# Patient Record
Sex: MALE | Race: WHITE | Employment: FULL TIME | URBAN - NONMETROPOLITAN AREA
[De-identification: names, ages, dates, MRNs, and addresses within clinical notes are randomized per-mention and may not be internally consistent; named-entity substitution may affect disease eponyms.]

---

## 2018-03-03 ENCOUNTER — HOSPITAL ENCOUNTER (EMERGENCY)
Age: 28
Discharge: HOME | End: 2018-03-03
Payer: COMMERCIAL

## 2018-03-03 VITALS
SYSTOLIC BLOOD PRESSURE: 131 MMHG | DIASTOLIC BLOOD PRESSURE: 63 MMHG | OXYGEN SATURATION: 99 % | HEART RATE: 88 BPM | RESPIRATION RATE: 16 BRPM | TEMPERATURE: 98.1 F

## 2018-03-03 DIAGNOSIS — N30.00 ACUTE CYSTITIS WITHOUT HEMATURIA: ICD-10-CM

## 2018-03-03 DIAGNOSIS — R36.0 PENILE DISCHARGE, WITHOUT BLOOD: Primary | ICD-10-CM

## 2018-03-03 LAB
BILIRUBIN URINE: NEGATIVE
BLOOD, URINE: NEGATIVE
CHARACTER, URINE: CLEAR
CHLAMYDIA TRACHOMATIS BY RT-PCR: DETECTED
COLOR: YELLOW
CT/NG SOURCE: ABNORMAL
GLUCOSE, URINE: NEGATIVE MG/DL
KETONES, URINE: NEGATIVE
LEUKOCYTES, UA: ABNORMAL
NEISSERIA GONORRHOEAE BY RT-PCR: NOT DETECTED
NITRITE, URINE: NEGATIVE
PH UA: 5.5 (ref 5–9)
PROTEIN UA: NEGATIVE MG/DL
REFLEX TO URINE C & S: ABNORMAL
SPECIFIC GRAVITY UA: 1.02 (ref 1–1.03)
UROBILINOGEN, URINE: 0.2 EU/DL (ref 0–1)

## 2018-03-03 PROCEDURE — 6360000002 HC RX W HCPCS: Performed by: NURSE PRACTITIONER

## 2018-03-03 PROCEDURE — 99213 OFFICE O/P EST LOW 20 MIN: CPT

## 2018-03-03 PROCEDURE — 99213 OFFICE O/P EST LOW 20 MIN: CPT | Performed by: NURSE PRACTITIONER

## 2018-03-03 PROCEDURE — 81003 URINALYSIS AUTO W/O SCOPE: CPT

## 2018-03-03 PROCEDURE — 2500000003 HC RX 250 WO HCPCS: Performed by: NURSE PRACTITIONER

## 2018-03-03 PROCEDURE — 6370000000 HC RX 637 (ALT 250 FOR IP): Performed by: NURSE PRACTITIONER

## 2018-03-03 PROCEDURE — 87491 CHLMYD TRACH DNA AMP PROBE: CPT

## 2018-03-03 PROCEDURE — 87591 N.GONORRHOEAE DNA AMP PROB: CPT

## 2018-03-03 PROCEDURE — 96372 THER/PROPH/DIAG INJ SC/IM: CPT

## 2018-03-03 PROCEDURE — 87086 URINE CULTURE/COLONY COUNT: CPT

## 2018-03-03 RX ORDER — AZITHROMYCIN 250 MG/1
1000 TABLET, FILM COATED ORAL ONCE
Status: COMPLETED | OUTPATIENT
Start: 2018-03-03 | End: 2018-03-03

## 2018-03-03 RX ORDER — METRONIDAZOLE 500 MG/1
500 TABLET ORAL 2 TIMES DAILY
Qty: 14 TABLET | Refills: 0 | Status: SHIPPED | OUTPATIENT
Start: 2018-03-03 | End: 2018-03-10

## 2018-03-03 RX ORDER — CIPROFLOXACIN 500 MG/1
500 TABLET, FILM COATED ORAL 2 TIMES DAILY
Qty: 10 TABLET | Refills: 0 | Status: SHIPPED | OUTPATIENT
Start: 2018-03-03 | End: 2018-03-08

## 2018-03-03 RX ADMIN — LIDOCAINE HYDROCHLORIDE 250 MG: 10 INJECTION, SOLUTION EPIDURAL; INFILTRATION; INTRACAUDAL; PERINEURAL at 09:19

## 2018-03-03 RX ADMIN — AZITHROMYCIN 1000 MG: 250 TABLET, FILM COATED ORAL at 09:18

## 2018-03-03 ASSESSMENT — ENCOUNTER SYMPTOMS
EYE PAIN: 0
CONSTIPATION: 0
EYE DISCHARGE: 0
DIARRHEA: 0
BACK PAIN: 0
SORE THROAT: 0
ABDOMINAL PAIN: 0
NAUSEA: 0
SHORTNESS OF BREATH: 0
RHINORRHEA: 0
STRIDOR: 0
WHEEZING: 0
COUGH: 0
COLOR CHANGE: 0
VOMITING: 0

## 2018-03-03 NOTE — ED TRIAGE NOTES
Patient walked to room 6 for penile discharge onset this morning, patient denies any pain. No other needs.

## 2018-03-03 NOTE — LETTER
[facility]  [address] [place]  Phone: [phone]               March 3, 2018    Patient: Leticia Pérez   YOB: 1990   Date of Visit: 3/3/2018       To Whom It May Concern:    Keri Apley was seen and treated in our emergency department on 3/3/2018. He may return to work on March 4, 2018.       Sincerely,       Angel Chi RN         Signature:__________________________________

## 2018-03-05 LAB — URINE CULTURE REFLEX: NORMAL

## 2018-11-05 ENCOUNTER — HOSPITAL ENCOUNTER (EMERGENCY)
Age: 28
Discharge: HOME | End: 2018-11-05
Payer: COMMERCIAL

## 2018-11-05 VITALS
OXYGEN SATURATION: 97 % | HEART RATE: 62 BPM | WEIGHT: 225 LBS | BODY MASS INDEX: 31.5 KG/M2 | DIASTOLIC BLOOD PRESSURE: 87 MMHG | TEMPERATURE: 98.3 F | HEIGHT: 71 IN | RESPIRATION RATE: 14 BRPM | SYSTOLIC BLOOD PRESSURE: 142 MMHG

## 2018-11-05 DIAGNOSIS — S71.112A LACERATION OF LEFT THIGH, INITIAL ENCOUNTER: Primary | ICD-10-CM

## 2018-11-05 PROCEDURE — 2709999900 HC NON-CHARGEABLE SUPPLY

## 2018-11-05 PROCEDURE — 99212 OFFICE O/P EST SF 10 MIN: CPT

## 2018-11-05 PROCEDURE — 12001 RPR S/N/AX/GEN/TRNK 2.5CM/<: CPT | Performed by: NURSE PRACTITIONER

## 2018-11-05 PROCEDURE — 12001 RPR S/N/AX/GEN/TRNK 2.5CM/<: CPT

## 2018-11-05 PROCEDURE — 99214 OFFICE O/P EST MOD 30 MIN: CPT | Performed by: NURSE PRACTITIONER

## 2018-11-05 ASSESSMENT — PAIN DESCRIPTION - PROGRESSION: CLINICAL_PROGRESSION: NOT CHANGED

## 2018-11-05 ASSESSMENT — PAIN DESCRIPTION - ORIENTATION: ORIENTATION: LEFT;ANTERIOR;UPPER

## 2018-11-05 ASSESSMENT — PAIN DESCRIPTION - PAIN TYPE: TYPE: ACUTE PAIN

## 2018-11-05 ASSESSMENT — PAIN DESCRIPTION - LOCATION: LOCATION: LEG

## 2018-11-05 ASSESSMENT — PAIN DESCRIPTION - FREQUENCY: FREQUENCY: CONTINUOUS

## 2018-11-05 ASSESSMENT — PAIN DESCRIPTION - DESCRIPTORS: DESCRIPTORS: ACHING

## 2018-11-05 ASSESSMENT — PAIN DESCRIPTION - ONSET: ONSET: SUDDEN

## 2018-11-05 ASSESSMENT — PAIN SCALES - GENERAL: PAINLEVEL_OUTOF10: 1

## 2018-11-06 ASSESSMENT — ENCOUNTER SYMPTOMS
VOMITING: 0
NAUSEA: 0

## 2018-11-17 ENCOUNTER — HOSPITAL ENCOUNTER (EMERGENCY)
Age: 28
Discharge: HOME | End: 2018-11-17

## 2018-11-17 VITALS
RESPIRATION RATE: 18 BRPM | OXYGEN SATURATION: 96 % | DIASTOLIC BLOOD PRESSURE: 69 MMHG | HEART RATE: 73 BPM | TEMPERATURE: 98.3 F | SYSTOLIC BLOOD PRESSURE: 141 MMHG

## 2018-11-17 DIAGNOSIS — Z48.02 ENCOUNTER FOR REMOVAL OF SUTURES: Primary | ICD-10-CM

## 2018-11-17 PROCEDURE — 2709999900 HC NON-CHARGEABLE SUPPLY

## 2018-11-17 PROCEDURE — 99024 POSTOP FOLLOW-UP VISIT: CPT | Performed by: NURSE PRACTITIONER

## 2018-11-17 PROCEDURE — 99211 OFF/OP EST MAY X REQ PHY/QHP: CPT

## 2018-11-17 ASSESSMENT — ENCOUNTER SYMPTOMS
VOMITING: 0
NAUSEA: 0

## 2019-01-29 ENCOUNTER — HOSPITAL ENCOUNTER (EMERGENCY)
Age: 29
Discharge: HOME | End: 2019-01-29

## 2019-01-29 VITALS
RESPIRATION RATE: 14 BRPM | SYSTOLIC BLOOD PRESSURE: 138 MMHG | DIASTOLIC BLOOD PRESSURE: 81 MMHG | HEIGHT: 71 IN | HEART RATE: 98 BPM | WEIGHT: 225 LBS | OXYGEN SATURATION: 96 % | TEMPERATURE: 98.8 F | BODY MASS INDEX: 31.5 KG/M2

## 2019-01-29 DIAGNOSIS — J02.9 ACUTE PHARYNGITIS, UNSPECIFIED ETIOLOGY: Primary | ICD-10-CM

## 2019-01-29 PROCEDURE — 99212 OFFICE O/P EST SF 10 MIN: CPT

## 2019-01-29 PROCEDURE — 99213 OFFICE O/P EST LOW 20 MIN: CPT | Performed by: NURSE PRACTITIONER

## 2019-01-29 RX ORDER — AZITHROMYCIN 250 MG/1
TABLET, FILM COATED ORAL
Qty: 6 TABLET | Refills: 0 | Status: SHIPPED | OUTPATIENT
Start: 2019-01-29 | End: 2019-10-18

## 2019-01-29 ASSESSMENT — PAIN DESCRIPTION - FREQUENCY: FREQUENCY: CONTINUOUS

## 2019-01-29 ASSESSMENT — ENCOUNTER SYMPTOMS
SINUS PAIN: 0
TROUBLE SWALLOWING: 0
ABDOMINAL PAIN: 0
VOMITING: 0
CHEST TIGHTNESS: 0
NAUSEA: 0
DIARRHEA: 0
COUGH: 0
BACK PAIN: 0
SORE THROAT: 1
SINUS PRESSURE: 0
SHORTNESS OF BREATH: 0
SINUS CONGESTION: 0
RHINORRHEA: 0

## 2019-01-29 ASSESSMENT — PAIN DESCRIPTION - DESCRIPTORS: DESCRIPTORS: SHARP

## 2019-01-29 ASSESSMENT — PAIN - FUNCTIONAL ASSESSMENT: PAIN_FUNCTIONAL_ASSESSMENT: ACTIVITIES ARE NOT PREVENTED

## 2019-01-29 ASSESSMENT — PAIN DESCRIPTION - PAIN TYPE: TYPE: ACUTE PAIN

## 2019-01-29 ASSESSMENT — PAIN SCALES - GENERAL: PAINLEVEL_OUTOF10: 6

## 2019-01-29 ASSESSMENT — PAIN DESCRIPTION - PROGRESSION: CLINICAL_PROGRESSION: GRADUALLY WORSENING

## 2019-01-29 ASSESSMENT — PAIN DESCRIPTION - LOCATION: LOCATION: THROAT

## 2019-01-29 ASSESSMENT — PAIN DESCRIPTION - ONSET: ONSET: GRADUAL

## 2019-10-18 ENCOUNTER — HOSPITAL ENCOUNTER (EMERGENCY)
Age: 29
Discharge: HOME | End: 2019-10-18

## 2019-10-18 VITALS
WEIGHT: 220 LBS | TEMPERATURE: 98.3 F | RESPIRATION RATE: 16 BRPM | HEART RATE: 75 BPM | SYSTOLIC BLOOD PRESSURE: 139 MMHG | BODY MASS INDEX: 30.68 KG/M2 | DIASTOLIC BLOOD PRESSURE: 75 MMHG

## 2019-10-18 DIAGNOSIS — R35.0 URINARY FREQUENCY: Primary | ICD-10-CM

## 2019-10-18 DIAGNOSIS — S39.012A STRAIN OF LUMBAR REGION, INITIAL ENCOUNTER: ICD-10-CM

## 2019-10-18 LAB
BACTERIA: ABNORMAL /HPF
BILIRUBIN URINE: NEGATIVE
BLOOD, URINE: NEGATIVE
CASTS 2: ABNORMAL /LPF
CASTS UA: ABNORMAL /LPF
CHARACTER, URINE: CLEAR
CHLAMYDIA TRACHOMATIS BY RT-PCR: NOT DETECTED
COLOR: YELLOW
CRYSTALS, UA: ABNORMAL
CT/NG SOURCE: NORMAL
EPITHELIAL CELLS, UA: ABNORMAL /HPF
GLUCOSE URINE: NEGATIVE MG/DL
KETONES, URINE: NEGATIVE
LEUKOCYTE ESTERASE, URINE: ABNORMAL
MISCELLANEOUS 2: ABNORMAL
NEISSERIA GONORRHOEAE BY RT-PCR: NOT DETECTED
NITRITE, URINE: NEGATIVE
PH UA: 5.5 (ref 5–9)
PROTEIN UA: NEGATIVE
RBC URINE: ABNORMAL /HPF
RENAL EPITHELIAL, UA: ABNORMAL
SPECIFIC GRAVITY, URINE: 1.02 (ref 1–1.03)
UROBILINOGEN, URINE: 1 EU/DL (ref 0–1)
WBC UA: ABNORMAL /HPF
YEAST: ABNORMAL

## 2019-10-18 PROCEDURE — 99283 EMERGENCY DEPT VISIT LOW MDM: CPT

## 2019-10-18 PROCEDURE — 87491 CHLMYD TRACH DNA AMP PROBE: CPT

## 2019-10-18 PROCEDURE — 87591 N.GONORRHOEAE DNA AMP PROB: CPT

## 2019-10-18 PROCEDURE — 81001 URINALYSIS AUTO W/SCOPE: CPT

## 2019-10-18 ASSESSMENT — ENCOUNTER SYMPTOMS
COUGH: 0
SORE THROAT: 0
NAUSEA: 1
DIARRHEA: 0
SINUS PAIN: 0
BACK PAIN: 1
CHEST TIGHTNESS: 0
VOMITING: 0
ABDOMINAL DISTENTION: 0
SHORTNESS OF BREATH: 0
ABDOMINAL PAIN: 0

## 2019-10-18 ASSESSMENT — PAIN DESCRIPTION - ORIENTATION: ORIENTATION: RIGHT;LEFT

## 2019-10-18 ASSESSMENT — PAIN SCALES - GENERAL: PAINLEVEL_OUTOF10: 3

## 2019-10-18 ASSESSMENT — PAIN DESCRIPTION - PAIN TYPE: TYPE: ACUTE PAIN

## 2019-10-18 ASSESSMENT — PAIN DESCRIPTION - LOCATION: LOCATION: BACK;GROIN

## 2019-10-18 ASSESSMENT — PAIN DESCRIPTION - DESCRIPTORS: DESCRIPTORS: ACHING;BURNING

## 2019-11-19 ENCOUNTER — HOSPITAL ENCOUNTER (EMERGENCY)
Age: 29
Discharge: HOME | End: 2019-11-19

## 2019-11-19 VITALS
HEIGHT: 71 IN | BODY MASS INDEX: 32.9 KG/M2 | OXYGEN SATURATION: 97 % | WEIGHT: 235 LBS | RESPIRATION RATE: 16 BRPM | HEART RATE: 70 BPM | SYSTOLIC BLOOD PRESSURE: 131 MMHG | DIASTOLIC BLOOD PRESSURE: 78 MMHG | TEMPERATURE: 97.7 F

## 2019-11-19 DIAGNOSIS — J02.9 ACUTE PHARYNGITIS, UNSPECIFIED ETIOLOGY: Primary | ICD-10-CM

## 2019-11-19 LAB
GROUP A STREP CULTURE, REFLEX: NEGATIVE
REFLEX THROAT C + S: NORMAL

## 2019-11-19 PROCEDURE — 99213 OFFICE O/P EST LOW 20 MIN: CPT | Performed by: NURSE PRACTITIONER

## 2019-11-19 PROCEDURE — 99214 OFFICE O/P EST MOD 30 MIN: CPT

## 2019-11-19 PROCEDURE — 87070 CULTURE OTHR SPECIMN AEROBIC: CPT

## 2019-11-19 PROCEDURE — 87880 STREP A ASSAY W/OPTIC: CPT

## 2019-11-19 ASSESSMENT — ENCOUNTER SYMPTOMS
SINUS PRESSURE: 0
COUGH: 1
SORE THROAT: 1
NAUSEA: 1
SHORTNESS OF BREATH: 0
VOMITING: 0
DIARRHEA: 0

## 2019-11-19 ASSESSMENT — PAIN DESCRIPTION - LOCATION: LOCATION: THROAT

## 2019-11-19 ASSESSMENT — PAIN SCALES - GENERAL: PAINLEVEL_OUTOF10: 7

## 2019-11-21 LAB — THROAT/NOSE CULTURE: NORMAL

## 2019-12-02 ENCOUNTER — HOSPITAL ENCOUNTER (EMERGENCY)
Age: 29
Discharge: HOME | End: 2019-12-02

## 2019-12-02 VITALS
BODY MASS INDEX: 32.78 KG/M2 | DIASTOLIC BLOOD PRESSURE: 72 MMHG | TEMPERATURE: 97.6 F | HEART RATE: 76 BPM | WEIGHT: 235 LBS | RESPIRATION RATE: 16 BRPM | SYSTOLIC BLOOD PRESSURE: 150 MMHG | OXYGEN SATURATION: 98 %

## 2019-12-02 DIAGNOSIS — R36.9 ABNORMAL PENILE DISCHARGE: Primary | ICD-10-CM

## 2019-12-02 LAB
BILIRUBIN URINE: NEGATIVE
BLOOD, URINE: NEGATIVE
CHARACTER, URINE: CLEAR
CHLAMYDIA TRACHOMATIS BY RT-PCR: NOT DETECTED
COLOR: YELLOW
CT/NG SOURCE: NORMAL
GLUCOSE, URINE: NEGATIVE MG/DL
KETONES, URINE: NEGATIVE
LEUKOCYTES, UA: ABNORMAL
NEISSERIA GONORRHOEAE BY RT-PCR: NOT DETECTED
NITRITE, URINE: NEGATIVE
PH UA: 5.5 (ref 5–9)
PROTEIN UA: NEGATIVE MG/DL
REFLEX TO URINE C & S: ABNORMAL
SPECIFIC GRAVITY UA: 1.01 (ref 1–1.03)
UROBILINOGEN, URINE: 0.2 EU/DL (ref 0–1)

## 2019-12-02 PROCEDURE — 6370000000 HC RX 637 (ALT 250 FOR IP): Performed by: NURSE PRACTITIONER

## 2019-12-02 PROCEDURE — 87491 CHLMYD TRACH DNA AMP PROBE: CPT

## 2019-12-02 PROCEDURE — 96372 THER/PROPH/DIAG INJ SC/IM: CPT

## 2019-12-02 PROCEDURE — 99213 OFFICE O/P EST LOW 20 MIN: CPT | Performed by: NURSE PRACTITIONER

## 2019-12-02 PROCEDURE — 6360000002 HC RX W HCPCS: Performed by: NURSE PRACTITIONER

## 2019-12-02 PROCEDURE — 81003 URINALYSIS AUTO W/O SCOPE: CPT

## 2019-12-02 PROCEDURE — 87591 N.GONORRHOEAE DNA AMP PROB: CPT

## 2019-12-02 PROCEDURE — 99213 OFFICE O/P EST LOW 20 MIN: CPT

## 2019-12-02 PROCEDURE — 2500000003 HC RX 250 WO HCPCS: Performed by: NURSE PRACTITIONER

## 2019-12-02 RX ORDER — AZITHROMYCIN 250 MG/1
1000 TABLET, FILM COATED ORAL ONCE
Status: COMPLETED | OUTPATIENT
Start: 2019-12-02 | End: 2019-12-02

## 2019-12-02 RX ORDER — METRONIDAZOLE 500 MG/1
2000 TABLET ORAL ONCE
Status: COMPLETED | OUTPATIENT
Start: 2019-12-02 | End: 2019-12-02

## 2019-12-02 RX ADMIN — AZITHROMYCIN 1000 MG: 250 TABLET, FILM COATED ORAL at 14:35

## 2019-12-02 RX ADMIN — METRONIDAZOLE 2000 MG: 500 TABLET, FILM COATED ORAL at 14:35

## 2019-12-02 RX ADMIN — LIDOCAINE HYDROCHLORIDE 250 MG: 10 INJECTION, SOLUTION EPIDURAL; INFILTRATION; INTRACAUDAL; PERINEURAL at 14:34

## 2019-12-02 ASSESSMENT — ENCOUNTER SYMPTOMS: ABDOMINAL PAIN: 0

## 2020-06-15 ENCOUNTER — HOSPITAL ENCOUNTER (EMERGENCY)
Age: 30
Discharge: HOME | End: 2020-06-15

## 2020-06-15 VITALS
DIASTOLIC BLOOD PRESSURE: 71 MMHG | RESPIRATION RATE: 14 BRPM | HEART RATE: 85 BPM | TEMPERATURE: 98.3 F | HEIGHT: 71 IN | BODY MASS INDEX: 32.9 KG/M2 | OXYGEN SATURATION: 99 % | SYSTOLIC BLOOD PRESSURE: 137 MMHG | WEIGHT: 235 LBS

## 2020-06-15 LAB
BILIRUBIN URINE: NEGATIVE
BLOOD, URINE: NEGATIVE
CHARACTER, URINE: CLEAR
COLOR: YELLOW
GLUCOSE URINE: NEGATIVE MG/DL
KETONES, URINE: NEGATIVE
LEUKOCYTE ESTERASE, URINE: ABNORMAL
NITRITE, URINE: NEGATIVE
PH UA: 6 (ref 5–9)
PROTEIN UA: NEGATIVE MG/DL
SPECIFIC GRAVITY, URINE: 1.02 (ref 1–1.03)
UROBILINOGEN, URINE: 1 EU/DL (ref 0.2–1)

## 2020-06-15 PROCEDURE — 2500000003 HC RX 250 WO HCPCS: Performed by: NURSE PRACTITIONER

## 2020-06-15 PROCEDURE — 81003 URINALYSIS AUTO W/O SCOPE: CPT

## 2020-06-15 PROCEDURE — 99213 OFFICE O/P EST LOW 20 MIN: CPT | Performed by: NURSE PRACTITIONER

## 2020-06-15 PROCEDURE — 6360000002 HC RX W HCPCS: Performed by: NURSE PRACTITIONER

## 2020-06-15 PROCEDURE — 87591 N.GONORRHOEAE DNA AMP PROB: CPT

## 2020-06-15 PROCEDURE — 6370000000 HC RX 637 (ALT 250 FOR IP): Performed by: NURSE PRACTITIONER

## 2020-06-15 PROCEDURE — 99213 OFFICE O/P EST LOW 20 MIN: CPT

## 2020-06-15 PROCEDURE — 87491 CHLMYD TRACH DNA AMP PROBE: CPT

## 2020-06-15 PROCEDURE — 96372 THER/PROPH/DIAG INJ SC/IM: CPT

## 2020-06-15 RX ORDER — METRONIDAZOLE 500 MG/1
2000 TABLET ORAL ONCE
Qty: 4 TABLET | Refills: 0 | Status: SHIPPED | OUTPATIENT
Start: 2020-06-15 | End: 2020-06-15

## 2020-06-15 RX ORDER — AZITHROMYCIN 250 MG/1
1000 TABLET, FILM COATED ORAL ONCE
Status: COMPLETED | OUTPATIENT
Start: 2020-06-15 | End: 2020-06-15

## 2020-06-15 RX ADMIN — AZITHROMYCIN MONOHYDRATE 1000 MG: 250 TABLET ORAL at 18:11

## 2020-06-15 RX ADMIN — LIDOCAINE HYDROCHLORIDE 250 MG: 10 INJECTION, SOLUTION EPIDURAL; INFILTRATION; INTRACAUDAL; PERINEURAL at 18:13

## 2020-06-15 ASSESSMENT — PAIN DESCRIPTION - PAIN TYPE: TYPE: ACUTE PAIN

## 2020-06-15 ASSESSMENT — PAIN DESCRIPTION - FREQUENCY: FREQUENCY: INTERMITTENT

## 2020-06-15 ASSESSMENT — PAIN - FUNCTIONAL ASSESSMENT: PAIN_FUNCTIONAL_ASSESSMENT: PREVENTS OR INTERFERES WITH MANY ACTIVE NOT PASSIVE ACTIVITIES

## 2020-06-15 ASSESSMENT — PAIN DESCRIPTION - LOCATION: LOCATION: PENIS

## 2020-06-15 ASSESSMENT — PAIN DESCRIPTION - DESCRIPTORS: DESCRIPTORS: BURNING;DISCOMFORT

## 2020-06-15 ASSESSMENT — PAIN DESCRIPTION - PROGRESSION: CLINICAL_PROGRESSION: GRADUALLY WORSENING

## 2020-06-15 ASSESSMENT — PAIN SCALES - GENERAL: PAINLEVEL_OUTOF10: 4

## 2020-06-15 NOTE — ED NOTES
No change in patients condition. No adverse medication reaction noted with meds given. Discharge instructions and prescriptions discussed with pt. Pt. Verbalized understanding of info given and ambulated to exit in stable condition.       Javan Clifford RN  06/15/20 6429

## 2020-06-15 NOTE — ED PROVIDER NOTES
[address]  Urgent Care Encounter       CHIEF COMPLAINT       Chief Complaint   Patient presents with    Penile Discharge     onset friday        Nurses Notes reviewed and I agree except as noted in the HPI. HISTORY OF PRESENT ILLNESS   Savannah Delgado is a 30 y.o. male who presents     Patient states that for the last 2-3 days he has white penile discharge. He states that he is concerned he could have possible STD. Denies any lesions or testicular pain. Denies any fevers. He reports being intimate with only one a partner for the last several months. REVIEW OF SYSTEMS     Review of Systems   Constitutional: Negative for chills, fatigue and fever. Genitourinary: Positive for discharge. Negative for decreased urine volume, difficulty urinating, dysuria, enuresis, flank pain, frequency, genital sores, hematuria, penile pain, penile swelling, scrotal swelling, testicular pain and urgency. Skin: Negative for rash. PAST MEDICAL HISTORY         Diagnosis Date    GERD (gastroesophageal reflux disease)     STD (male)        SURGICALHISTORY     Patient  has a past surgical history that includes Tympanostomy tube placement. CURRENT MEDICATIONS       Previous Medications    TESTOSTERONE CYPIONATE 250 MG/ML SOLN    Inject 250 mg into the muscle. THERAPEUTIC MULTIVITAMIN-MINERALS (THERAGRAN-M) TABLET    Take 1 tablet by mouth daily. ALLERGIES     Patient is has No Known Allergies. Patients   Immunization History   Administered Date(s) Administered    Influenza Vaccine, unspecified formulation 10/11/2016       FAMILY HISTORY     Patient's family history includes Diabetes in his maternal grandfather and paternal grandmother; Heart Disease in his maternal grandfather; No Known Problems in his father and mother. SOCIAL HISTORY     Patient  reports that he has never smoked.  He has never used smokeless tobacco. He reports current alcohol use of about 1.0 standard PROCEDURES:  None    FINAL IMPRESSION      1. Screening examination for STD (sexually transmitted disease)    2. Dysuria          DISPOSITION/ PLAN   Patient is discharged home with prescription for Flagyl for treatment of possible exposure to STD. Discussed with patient that urinalysis is negative for any leukocytes or nitrates. Patient is informed that gonorrhea and Chlamydia cultures can take up to 3 days to result. Patient should refrain from any sexual intercourse until all results have been obtained. Recommend follow-up with health department within 2 weeks for recheck. Patient is informed that trichomonas testing for males is not available in urgent care, however given patient's symptoms do have suspicion for.         PATIENT REFERRED TO:  Mariaelena Patel PA-C  [address] [facility] / [place] [address]      DISCHARGE MEDICATIONS:  New Prescriptions    METRONIDAZOLE (FLAGYL) 500 MG TABLET    Take 4 tablets by mouth once for 1 dose       Discontinued Medications    No medications on file       Current Discharge Medication List          EVITA Knapp NP    (Please note that portions of this note were completed with a voice recognition program. Efforts were made to edit the dictations but occasionally words are mis-transcribed.)         EVITA Bravo NP  06/15/20 1937

## 2020-06-16 LAB
CHLAMYDIA TRACHOMATIS BY RT-PCR: DETECTED
CT/NG SOURCE: ABNORMAL
NEISSERIA GONORRHOEAE BY RT-PCR: NOT DETECTED

## 2020-06-27 ENCOUNTER — HOSPITAL ENCOUNTER (EMERGENCY)
Age: 30
Discharge: HOME | End: 2020-06-27

## 2020-06-27 VITALS
SYSTOLIC BLOOD PRESSURE: 145 MMHG | HEART RATE: 87 BPM | RESPIRATION RATE: 16 BRPM | DIASTOLIC BLOOD PRESSURE: 88 MMHG | TEMPERATURE: 97.8 F

## 2020-06-27 LAB
BILIRUBIN URINE: NEGATIVE
BLOOD, URINE: NEGATIVE
CHARACTER, URINE: CLEAR
COLOR: YELLOW
GLUCOSE URINE: NEGATIVE MG/DL
KETONES, URINE: NEGATIVE
LEUKOCYTE ESTERASE, URINE: NEGATIVE
NITRITE, URINE: NEGATIVE
PH UA: 6 (ref 5–9)
PROTEIN UA: NEGATIVE MG/DL
SPECIFIC GRAVITY, URINE: 1.02 (ref 1–1.03)
UROBILINOGEN, URINE: 0.2 EU/DL (ref 0.2–1)

## 2020-06-27 PROCEDURE — 96372 THER/PROPH/DIAG INJ SC/IM: CPT

## 2020-06-27 PROCEDURE — 81003 URINALYSIS AUTO W/O SCOPE: CPT

## 2020-06-27 PROCEDURE — 87591 N.GONORRHOEAE DNA AMP PROB: CPT

## 2020-06-27 PROCEDURE — 87491 CHLMYD TRACH DNA AMP PROBE: CPT

## 2020-06-27 PROCEDURE — 6360000002 HC RX W HCPCS: Performed by: NURSE PRACTITIONER

## 2020-06-27 PROCEDURE — 99213 OFFICE O/P EST LOW 20 MIN: CPT

## 2020-06-27 PROCEDURE — 6370000000 HC RX 637 (ALT 250 FOR IP): Performed by: NURSE PRACTITIONER

## 2020-06-27 PROCEDURE — 2500000003 HC RX 250 WO HCPCS: Performed by: NURSE PRACTITIONER

## 2020-06-27 PROCEDURE — 99213 OFFICE O/P EST LOW 20 MIN: CPT | Performed by: NURSE PRACTITIONER

## 2020-06-27 RX ORDER — AZITHROMYCIN 250 MG/1
1000 TABLET, FILM COATED ORAL ONCE
Status: COMPLETED | OUTPATIENT
Start: 2020-06-27 | End: 2020-06-27

## 2020-06-27 RX ADMIN — AZITHROMYCIN MONOHYDRATE 1000 MG: 250 TABLET ORAL at 15:59

## 2020-06-27 RX ADMIN — LIDOCAINE HYDROCHLORIDE 250 MG: 10 INJECTION, SOLUTION EPIDURAL; INFILTRATION; INTRACAUDAL; PERINEURAL at 16:02

## 2020-06-27 ASSESSMENT — PAIN DESCRIPTION - LOCATION: LOCATION: PENIS

## 2020-06-27 ASSESSMENT — PAIN DESCRIPTION - DESCRIPTORS: DESCRIPTORS: DISCOMFORT

## 2020-06-27 ASSESSMENT — ENCOUNTER SYMPTOMS
NAUSEA: 0
VOMITING: 0

## 2020-06-27 ASSESSMENT — PAIN DESCRIPTION - ONSET: ONSET: GRADUAL

## 2020-06-27 ASSESSMENT — PAIN DESCRIPTION - PAIN TYPE: TYPE: ACUTE PAIN

## 2020-06-27 ASSESSMENT — PAIN DESCRIPTION - FREQUENCY: FREQUENCY: INTERMITTENT

## 2020-06-27 ASSESSMENT — PAIN SCALES - GENERAL: PAINLEVEL_OUTOF10: 5

## 2020-06-27 ASSESSMENT — PAIN - FUNCTIONAL ASSESSMENT: PAIN_FUNCTIONAL_ASSESSMENT: PREVENTS OR INTERFERES SOME ACTIVE ACTIVITIES AND ADLS

## 2020-06-27 ASSESSMENT — PAIN DESCRIPTION - PROGRESSION: CLINICAL_PROGRESSION: GRADUALLY WORSENING

## 2020-06-27 NOTE — ED NOTES
No change in patients condition. Discharge instructions and prescriptions discussed with pt. Pt. Verbalized understanding of info given and ambulated to exit in stable condition.       Mati Fraser RN  06/27/20 4618

## 2020-06-27 NOTE — ED PROVIDER NOTES
IMAGING:    No orders to display         EKG:      URGENT CARE COURSE:     Vitals:    06/27/20 1520   BP: (!) 145/88   Pulse: 87   Resp: 16   Temp: 97.8 °F (36.6 °C)   TempSrc: Temporal       Medications   azithromycin (ZITHROMAX) tablet 1,000 mg (1,000 mg Oral Given 6/27/20 1559)   cefTRIAXone (ROCEPHIN) 250 mg in lidocaine 1 % 1 mL IM Injection (250 mg Intramuscular Given 6/27/20 1602)            PROCEDURES:  None    FINAL IMPRESSION      1. Penile discharge    2. Chlamydia infection          DISPOSITION/ PLAN     The patient presents with penile discharge and dysuria. He did test positive for chlamydia on [address] 15. He was treated at that time however symptoms have returned without any further sexual encounters. Will treat again with azithromycin and ceftriaxone. Patient instructed to abstain from sex for at least the next 7 days. Gonorrhea and Chlamydia testing will be completed. Follow-up in the next week with any further concerns with family doctor or the urgent care clinic. Further instructions were outlined verbally and in the patient's discharge instructions. All the patient's questions were answered. The patient/parent agreed with the plan and was discharged from the [facility] in good condition.       PATIENT REFERRED TO:  Samuel Dill PA-C  [address] [facility] / [address] [facility]      DISCHARGE MEDICATIONS:  Discharge Medication List as of 6/27/2020  3:51 PM          Discharge Medication List as of 6/27/2020  3:51 PM          Discharge Medication List as of 6/27/2020  3:51 PM          EVITA Byrne CNP    (Please note that portions of this note were completed with a voice recognition program. Efforts were made to edit the dictations but occasionally words are mis-transcribed.)         EVITA Byrne CNP  06/27/20 9767

## 2020-06-28 LAB
CHLAMYDIA TRACHOMATIS BY RT-PCR: NOT DETECTED
CT/NG SOURCE: NORMAL
NEISSERIA GONORRHOEAE BY RT-PCR: NOT DETECTED

## 2021-11-08 ENCOUNTER — HOSPITAL ENCOUNTER (EMERGENCY)
Age: 31
Discharge: HOME | End: 2021-11-08
Payer: COMMERCIAL

## 2021-11-08 ENCOUNTER — APPOINTMENT (OUTPATIENT)
Dept: GENERAL RADIOLOGY | Age: 31
End: 2021-11-08
Payer: COMMERCIAL

## 2021-11-08 VITALS
HEIGHT: 71 IN | BODY MASS INDEX: 34.3 KG/M2 | RESPIRATION RATE: 14 BRPM | WEIGHT: 245 LBS | OXYGEN SATURATION: 97 % | HEART RATE: 76 BPM | TEMPERATURE: 98 F

## 2021-11-08 DIAGNOSIS — S56.912A FOREARM STRAIN, LEFT, INITIAL ENCOUNTER: Primary | ICD-10-CM

## 2021-11-08 PROCEDURE — 99213 OFFICE O/P EST LOW 20 MIN: CPT

## 2021-11-08 PROCEDURE — 99213 OFFICE O/P EST LOW 20 MIN: CPT | Performed by: NURSE PRACTITIONER

## 2021-11-08 PROCEDURE — 73080 X-RAY EXAM OF ELBOW: CPT

## 2021-11-08 ASSESSMENT — PAIN DESCRIPTION - LOCATION: LOCATION: ELBOW

## 2021-11-08 ASSESSMENT — PAIN DESCRIPTION - ORIENTATION: ORIENTATION: LEFT

## 2021-11-08 ASSESSMENT — PAIN DESCRIPTION - DESCRIPTORS: DESCRIPTORS: THROBBING

## 2021-11-08 ASSESSMENT — PAIN DESCRIPTION - ONSET: ONSET: SUDDEN

## 2021-11-08 ASSESSMENT — PAIN DESCRIPTION - FREQUENCY: FREQUENCY: CONTINUOUS

## 2021-11-08 ASSESSMENT — PAIN DESCRIPTION - PAIN TYPE: TYPE: ACUTE PAIN

## 2021-11-08 ASSESSMENT — PAIN DESCRIPTION - PROGRESSION: CLINICAL_PROGRESSION: NOT CHANGED

## 2021-11-08 ASSESSMENT — PAIN SCALES - GENERAL: PAINLEVEL_OUTOF10: 6

## 2021-11-08 ASSESSMENT — PAIN - FUNCTIONAL ASSESSMENT: PAIN_FUNCTIONAL_ASSESSMENT: PREVENTS OR INTERFERES SOME ACTIVE ACTIVITIES AND ADLS

## 2021-11-08 NOTE — ED PROVIDER NOTES
[address]  Urgent Care Encounter       CHIEF COMPLAINT       Chief Complaint   Patient presents with    Arm Injury     tried to stop a piece of equipment from falling at work and injured left elbow       Nurses Notes reviewed and I agree except as noted in the HPI. HISTORY OF PRESENT ILLNESS   Crys Monge is a 28 y.o. male who presents with complaints of a left forearm injury. 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 Tube weight approximately 200 pounds. He grabbed the end of it when it fell it jerked his arm down, causing the injury. He reports normal range of motion but does have increased pain with full flexion and full extension. The history is provided by the patient. REVIEW OF SYSTEMS     Review of Systems   Constitutional: Negative for fever. Gastrointestinal: Negative for nausea and vomiting. Musculoskeletal:        Left forearm/elbow injury     Skin: Negative for wound. Neurological: Negative for numbness. PAST MEDICAL HISTORY         Diagnosis Date    GERD (gastroesophageal reflux disease)     STD (male)        SURGICALHISTORY     Patient  has a past surgical history that includes Tympanostomy tube placement. CURRENT MEDICATIONS       Discharge Medication List as of 11/8/2021  7:22 PM      CONTINUE these medications which have NOT CHANGED    Details   Testosterone Cypionate 250 MG/ML SOLN Inject 250 mg into the muscle. Historical Med      therapeutic multivitamin-minerals (THERAGRAN-M) tablet Take 1 tablet by mouth daily. ALLERGIES     Patient is has No Known Allergies.     Patients   Immunization History   Administered Date(s) Administered    Influenza Vaccine, unspecified formulation 10/11/2016       FAMILY HISTORY     Patient's family history includes Diabetes in his maternal grandfather and paternal grandmother; Heart Disease in his maternal grandfather; No Known Problems in his father and mother. SOCIAL HISTORY     Patient  reports that he has never smoked. He has never used smokeless tobacco. He reports current alcohol use of about 1.0 standard drink of alcohol per week. He reports that he does not use drugs. PHYSICAL EXAM     ED TRIAGE VITALS   , Temp: 98 °F (36.7 °C), Pulse: 76, Resp: 14, SpO2: 97 %,Estimated body mass index is 34.17 kg/m² as calculated from the following:    Height as of this encounter: 5' 11\" (1.803 m). Weight as of this encounter: 245 lb (111.1 kg). ,No LMP for male patient. Physical Exam  Vitals and nursing note reviewed. Constitutional:       General: He is not in acute distress. Appearance: He is well-developed. HENT:      Head: Normocephalic and atraumatic. Pulmonary:      Effort: Pulmonary effort is normal. No respiratory distress. Musculoskeletal:      Left forearm: Tenderness (lateral, proximal forearm. No tenderness over lateral epicondyl) present. No swelling. Arms:    Skin:     General: Skin is warm and dry. Neurological:      General: No focal deficit present. Mental Status: He is alert and oriented to person, place, and time. Psychiatric:         Mood and Affect: Mood normal.         Speech: Speech normal.         Behavior: Behavior normal. Behavior is cooperative. DIAGNOSTIC RESULTS     Labs:No results found for this visit on 11/08/21. IMAGING:    XR ELBOW LEFT (MIN 3 VIEWS)   Final Result   No acute findings. This document has been electronically signed by: Jayda Resendiz MD on    11/08/2021 06:41 PM            EKG:      URGENT CARE COURSE:     Vitals:    11/08/21 1817   Pulse: 76   Resp: 14   Temp: 98 °F (36.7 °C)   TempSrc: Temporal   SpO2: 97%   Weight: 245 lb (111.1 kg)   Height: 5' 11\" (1.803 m)       Medications - No data to display         PROCEDURES:  None    FINAL IMPRESSION      1.  Forearm strain, left, initial encounter          DISPOSITION/ PLAN     Patient presents with a forearm muscle strain after he work incident this evening. X-ray of the elbow was negative for acute fracture dislocation. Ice and rest. Tylenol and or Motrin for pain. Follow-up with occupational health with any further needs regarding this injury. Can return to work without restriction. Further instructions were outlined verbally and in the patient's discharge instructions. All the patient's questions were answered. The patient/parent agreed with the plan and was discharged from the [facility] in good condition. PATIENT REFERRED TO:  No primary care provider on file. No primary physician on file.       DISCHARGE MEDICATIONS:  Discharge Medication List as of 11/8/2021  7:22 PM          Discharge Medication List as of 11/8/2021  7:22 PM          Discharge Medication List as of 11/8/2021  7:22 PM          EVITA Barton CNP    (Please note that portions of this note were completed with a voice recognition program. Efforts were made to edit the dictations but occasionally words are mis-transcribed.)         EVITA Barton CNP  11/09/21 0809

## 2021-11-09 ASSESSMENT — ENCOUNTER SYMPTOMS
VOMITING: 0
NAUSEA: 0

## 2021-11-09 NOTE — ED NOTES
PT GIVEN DISCHARGE INSTRUCTIONS, VERBALIZES UNDERSTANDING. PT ASSESSMENT UNCHANGED, DISCHARGED IN STABLE CONDITION.         Vale Merlin, RN  11/08/21 3575 Donor Site Anesthesia Type: same as repair anesthesia

## 2022-02-21 ENCOUNTER — HOSPITAL ENCOUNTER (EMERGENCY)
Age: 32
Discharge: HOME | End: 2022-02-22
Attending: EMERGENCY MEDICINE
Payer: COMMERCIAL

## 2022-02-21 ENCOUNTER — APPOINTMENT (OUTPATIENT)
Dept: GENERAL RADIOLOGY | Age: 32
End: 2022-02-21
Payer: COMMERCIAL

## 2022-02-21 DIAGNOSIS — R07.9 CHEST PAIN, UNSPECIFIED TYPE: Primary | ICD-10-CM

## 2022-02-21 LAB
BASOPHILS # BLD: 0.4 %
BASOPHILS ABSOLUTE: 0 THOU/MM3 (ref 0–0.1)
D-DIMER QUANTITATIVE: 255 NG/ML FEU (ref 0–500)
EOSINOPHIL # BLD: 1.1 %
EOSINOPHILS ABSOLUTE: 0.1 THOU/MM3 (ref 0–0.4)
ERYTHROCYTE [DISTWIDTH] IN BLOOD BY AUTOMATED COUNT: 11.8 % (ref 11.5–14.5)
ERYTHROCYTE [DISTWIDTH] IN BLOOD BY AUTOMATED COUNT: 40.2 FL (ref 35–45)
HCT VFR BLD CALC: 50.1 % (ref 42–52)
HEMOGLOBIN: 17.4 GM/DL (ref 14–18)
IMMATURE GRANS (ABS): 0.02 THOU/MM3 (ref 0–0.07)
IMMATURE GRANULOCYTES: 0.3 %
LYMPHOCYTES # BLD: 23.6 %
LYMPHOCYTES ABSOLUTE: 1.9 THOU/MM3 (ref 1–4.8)
MCH RBC QN AUTO: 32.3 PG (ref 26–33)
MCHC RBC AUTO-ENTMCNC: 34.7 GM/DL (ref 32.2–35.5)
MCV RBC AUTO: 92.9 FL (ref 80–94)
MONOCYTES # BLD: 10.2 %
MONOCYTES ABSOLUTE: 0.8 THOU/MM3 (ref 0.4–1.3)
NUCLEATED RED BLOOD CELLS: 0 /100 WBC
PLATELET # BLD: 246 THOU/MM3 (ref 130–400)
PMV BLD AUTO: 8.8 FL (ref 9.4–12.4)
RBC # BLD: 5.39 MILL/MM3 (ref 4.7–6.1)
SEG NEUTROPHILS: 64.4 %
SEGMENTED NEUTROPHILS ABSOLUTE COUNT: 5.2 THOU/MM3 (ref 1.8–7.7)
WBC # BLD: 8 THOU/MM3 (ref 4.8–10.8)

## 2022-02-21 PROCEDURE — 71045 X-RAY EXAM CHEST 1 VIEW: CPT

## 2022-02-21 PROCEDURE — 85025 COMPLETE CBC W/AUTO DIFF WBC: CPT

## 2022-02-21 PROCEDURE — 80048 BASIC METABOLIC PNL TOTAL CA: CPT

## 2022-02-21 PROCEDURE — 85379 FIBRIN DEGRADATION QUANT: CPT

## 2022-02-21 PROCEDURE — 93005 ELECTROCARDIOGRAM TRACING: CPT

## 2022-02-21 PROCEDURE — 99283 EMERGENCY DEPT VISIT LOW MDM: CPT

## 2022-02-21 PROCEDURE — 84484 ASSAY OF TROPONIN QUANT: CPT

## 2022-02-21 ASSESSMENT — ENCOUNTER SYMPTOMS
ABDOMINAL PAIN: 0
VOMITING: 0
SHORTNESS OF BREATH: 1
COUGH: 0
BACK PAIN: 0

## 2022-02-22 VITALS
WEIGHT: 250 LBS | OXYGEN SATURATION: 99 % | HEIGHT: 71 IN | RESPIRATION RATE: 18 BRPM | HEART RATE: 77 BPM | TEMPERATURE: 98.3 F | SYSTOLIC BLOOD PRESSURE: 140 MMHG | BODY MASS INDEX: 35 KG/M2 | DIASTOLIC BLOOD PRESSURE: 84 MMHG

## 2022-02-22 LAB
ANION GAP SERPL CALCULATED.3IONS-SCNC: 14 MEQ/L (ref 8–16)
BUN BLDV-MCNC: 20 MG/DL (ref 7–22)
CALCIUM SERPL-MCNC: 9.5 MG/DL (ref 8.5–10.5)
CHLORIDE BLD-SCNC: 100 MEQ/L (ref 98–111)
CO2: 26 MEQ/L (ref 23–33)
CREAT SERPL-MCNC: 0.9 MG/DL (ref 0.4–1.2)
GFR SERPL CREATININE-BSD FRML MDRD: > 90 ML/MIN/1.73M2
GLUCOSE BLD-MCNC: 90 MG/DL (ref 70–108)
OSMOLALITY CALCULATION: 281.5 MOSMOL/KG (ref 275–300)
POTASSIUM REFLEX MAGNESIUM: 4.2 MEQ/L (ref 3.5–5.2)
SODIUM BLD-SCNC: 140 MEQ/L (ref 135–145)
TROPONIN T: < 0.01 NG/ML

## 2022-02-22 NOTE — ED TRIAGE NOTES
Pt arrives to ED from home with c/o chest pain that started dull in his chest, then progressed down his arm and into his back. Pt states he had a brief moment of SOB and chest pain on his way home. Pt denies any other issues at this time or anything that may have brought it on.    Pt is AOx4, VSS

## 2022-02-22 NOTE — ED PROVIDER NOTES
[address] [facility] [address] [address] EMERGENCY DEPT    EMERGENCY MEDICINE     Pt Name: Cindy Putnam  MRN: [id number]  [place] 1990  Date of evaluation: 2/21/2022  Provider: Julien Young MD    CHIEF COMPLAINT       Chief Complaint   Patient presents with    Chest Pain       HISTORY OF PRESENT ILLNESS    Cindy Putnam is a pleasant 28 y.o. male   Presents to the emergency department from home complaining of L sided chest pain all day today. Then around three hours ago, developed sharp pain in his finger tips, then his hand, then his wrist, then his bicep. He also reports he felt a little short of breath, but denies any pleuritic pain. He reports his pain is unrelated to exertion or eating. No other complaints, no other known exacerbating/relieving factors. Triage notes and Nursing notes were reviewed by myself. Any discrepancies are addressed above. PAST MEDICAL HISTORY     Past Medical History:   Diagnosis Date    GERD (gastroesophageal reflux disease)     STD (male)        SURGICAL HISTORY       Past Surgical History:   Procedure Laterality Date    TYMPANOSTOMY TUBE PLACEMENT         CURRENT MEDICATIONS       Previous Medications    TESTOSTERONE CYPIONATE 250 MG/ML SOLN    Inject 250 mg into the muscle. THERAPEUTIC MULTIVITAMIN-MINERALS (THERAGRAN-M) TABLET    Take 1 tablet by mouth daily. ALLERGIES     Patient has no known allergies.     FAMILY HISTORY       Family History   Problem Relation Age of Onset    No Known Problems Mother     No Known Problems Father     Diabetes Maternal Grandfather     Heart Disease Maternal Grandfather     Diabetes Paternal Grandmother         SOCIAL HISTORY       Social History     Socioeconomic History    Marital status: Single     Spouse name: None    Number of children: 0    Years of education: None    Highest education level: None   Occupational History    None   Tobacco Use    Smoking status: Never Smoker    Smokeless tobacco: Never Used   Vaping Use    Vaping Use: Never used   Substance and Sexual Activity    Alcohol use: Yes     Alcohol/week: 1.0 standard drink     Types: 1 Cans of beer per week     Comment: on rare occasion     Drug use: No    Sexual activity: Yes     Partners: Female   Other Topics Concern    None   Social History Narrative    None     Social Determinants of Health     Financial Resource Strain:     Difficulty of Paying Living Expenses: Not on file   Food Insecurity:     Worried About Running Out of Food in the Last Year: Not on file    Berto of Food in the Last Year: Not on file   Transportation Needs:     Lack of Transportation (Medical): Not on file    Lack of Transportation (Non-Medical): Not on file   Physical Activity:     Days of Exercise per Week: Not on file    Minutes of Exercise per Session: Not on file   Stress:     Feeling of Stress : Not on file   Social Connections:     Frequency of Communication with Friends and Family: Not on file    Frequency of Social Gatherings with Friends and Family: Not on file    Attends Cheondoism Services: Not on file    Active Member of [address] [place] [address] or Organizations: Not on file    Attends Club or Organization Meetings: Not on file    Marital Status: Not on file   Intimate Partner Violence:     Fear of Current or Ex-Partner: Not on file    Emotionally Abused: Not on file    Physically Abused: Not on file    Sexually Abused: Not on file   Housing Stability:     Unable to Pay for Housing in the Last Year: Not on file    Number of [place] in the Last Year: Not on file    Unstable Housing in the Last Year: Not on file       REVIEW OF SYSTEMS     Review of Systems   Constitutional: Negative for chills and fever. Respiratory: Positive for shortness of breath. Negative for cough. Cardiovascular: Positive for chest pain. Negative for leg swelling. Gastrointestinal: Negative for abdominal pain and vomiting. Musculoskeletal: Negative for back pain and neck pain.    Skin: Negative for rash and wound. Neurological: Negative for weakness and numbness. All other systems reviewed and are negative. Except as noted above the remainder of the review of systems was reviewed and is. PHYSICAL EXAM    (up to 7 for level 4, 8 or more for level 5)     ED Triage Vitals [02/21/22 2309]   BP Temp Temp Source Pulse Resp SpO2 Height Weight   (!) 147/86 98 °F (36.7 °C) Oral 74 18 100 % 5' 11\" (1.803 m) 250 lb (113.4 kg)       Physical Exam  Vitals and nursing note reviewed. Constitutional:       General: He is awake. HENT:      Head: Normocephalic and atraumatic. Mouth/Throat:      Mouth: Mucous membranes are moist.   Eyes:      General: Lids are normal.      Conjunctiva/sclera: Conjunctivae normal.   Neck:      Vascular: No JVD. Trachea: No tracheal deviation. Cardiovascular:      Rate and Rhythm: Normal rate and regular rhythm. Pulses: Normal pulses. Heart sounds: No murmur heard. No friction rub. No gallop. Comments: No calf tenderness  Pulmonary:      Effort: Pulmonary effort is normal.      Breath sounds: Normal breath sounds. No wheezing, rhonchi or rales. Abdominal:      Palpations: Abdomen is soft. Tenderness: There is no abdominal tenderness. Musculoskeletal:      Cervical back: Neck supple. Right lower leg: No edema. Left lower leg: No edema. Skin:     General: Skin is warm. Findings: No rash. Neurological:      General: No focal deficit present. Mental Status: He is alert. Sensory: No sensory deficit. Motor: No weakness. Psychiatric:         Behavior: Behavior is cooperative.          DIAGNOSTIC RESULTS     EKG:(none if blank)  All EKG's are [facility] Phys[facility] who either signs or Co-signs this chart in the absence of a cardiologist.    Tod Police: Sinus gentry at rate of 58, no stemi    RADIOLOGY: (none if blank)   Interpretation per the Radiologistbelow, if available at the time of this note:    XR CHEST PORTABLE   Final Result   Impression:      No significant abnormalities. This document has been electronically signed by: Antonella Michaels MD on    02/21/2022 11:33 PM          LABS:  Labs Reviewed   CBC WITH AUTO DIFFERENTIAL - Abnormal; Notable for the following components:       Result Value    MPV 8.8 (*)     All other components within normal limits   TROPONIN   BASIC METABOLIC PANEL W/ REFLEX TO MG FOR LOW K   D-DIMER, QUANTITATIVE   ANION GAP   GLOMERULAR FILTRATION RATE, ESTIMATED   OSMOLALITY       All other labs were within normal range or not returned as of this dictation. Please note, any cultures that may have been sent were not resulted at the time of this patient visit. EMERGENCY DEPARTMENT COURSE andMedical Decision Making:     MDM/   Pt presents to the ER with atypical, nonexertional, nonpleuritic cp. EKG without stemi, trop neg x 1. I d/w patient risks and benefits of repeat troponin, he declines, states \"I feel fine\". Aware of risks of missed diagnosis, however clinical suspicion for ACS low. Pt counseled regarding importance of close outpatient f/u and ER return precautions. Strict returnprecautions and follow up instructions were discussed with the patient with which the patient agrees      ED Medications administered this visit:  Medications - No data to display      Procedures: (None if blank)      The care of the patient took place at a time of a significant regional and national Covid-19 surge, resulting in severe overcrowding and limitation of healthcare resources, including nursing staffing and inpatient beds. CLINICAL IMPRESSION     1.  Chest pain, unspecified type          DISPOSITION/PLAN   DISPOSITION Decision To Discharge 02/22/2022 12:42:32 AM      PATIENT REFERRED TO:  [facility] [address]  [phone]  In 2 days        DISCHARGE MEDICATIONS:  New Prescriptions    No medications on file           (Please note that portions of this note were completed with a voice recognition program.  Efforts were made to edit the dictations but occasionallywords are mis-transcribed.)      Crys Castellanos MD,FACEP (electronically signed)  Attending Physician, Emergency Department        Karissa Swan MD  02/22/22 4453

## 2022-02-22 NOTE — ED NOTES
ED nurse-to-nurse bedside report    Chief Complaint   Patient presents with    Chest Pain      LOC: alert and orientated to name, place, date  Vital signs   Vitals:    02/21/22 2309 02/22/22 0029   BP: (!) 147/86 (!) 140/84   Pulse: 74 77   Resp: 18 18   Temp: 98 °F (36.7 °C) 98.3 °F (36.8 °C)   TempSrc: Oral Oral   SpO2: 100% 99%   Weight: 250 lb (113.4 kg)    Height: 5' 11\" (1.803 m)       Pain:    Pain Interventions: MAR  Pain Goal: 0  Oxygen: No    Current needs required RA   Telemetry: No  LDAs:   Peripheral IV 02/21/22 Left Antecubital (Active)   Site Assessment Clean;Dry; Intact 02/21/22 2319   Dressing Status Clean;Dry; Intact 02/21/22 2319     Continuous Infusions:   Mobility: Independent  Lopez Fall Risk Score: No flowsheet data found.   Fall Interventions: socks bed rails call [facility]  Report given to:         Surekha England RN  02/22/22 1432

## 2022-02-22 NOTE — ED NOTES
Patient resting in bed. Respirations easy and unlabored. No distress noted. Call light within reach.   Pt updated on POC   Awaiting results, will continue to monitor       Genaro Monteiro, RN  02/22/22 0035

## 2022-02-23 LAB
EKG ATRIAL RATE: 58 BPM
EKG P AXIS: 53 DEGREES
EKG P-R INTERVAL: 140 MS
EKG Q-T INTERVAL: 380 MS
EKG QRS DURATION: 92 MS
EKG QTC CALCULATION (BAZETT): 373 MS
EKG R AXIS: 68 DEGREES
EKG T AXIS: 16 DEGREES
EKG VENTRICULAR RATE: 58 BPM

## 2022-02-23 PROCEDURE — 93010 ELECTROCARDIOGRAM REPORT: CPT | Performed by: INTERNAL MEDICINE

## 2022-04-04 ENCOUNTER — HOSPITAL ENCOUNTER (EMERGENCY)
Age: 32
Discharge: HOME | End: 2022-04-04
Payer: COMMERCIAL

## 2022-04-04 VITALS
SYSTOLIC BLOOD PRESSURE: 174 MMHG | DIASTOLIC BLOOD PRESSURE: 87 MMHG | OXYGEN SATURATION: 99 % | TEMPERATURE: 98.1 F | HEART RATE: 80 BPM | RESPIRATION RATE: 20 BRPM

## 2022-04-04 DIAGNOSIS — R06.02 SHORTNESS OF BREATH: ICD-10-CM

## 2022-04-04 DIAGNOSIS — R00.2 PALPITATIONS: Primary | ICD-10-CM

## 2022-04-04 LAB
EKG ATRIAL RATE: 76 BPM
EKG P AXIS: 57 DEGREES
EKG P-R INTERVAL: 132 MS
EKG Q-T INTERVAL: 344 MS
EKG QRS DURATION: 90 MS
EKG QTC CALCULATION (BAZETT): 387 MS
EKG R AXIS: 67 DEGREES
EKG T AXIS: 1 DEGREES
EKG VENTRICULAR RATE: 76 BPM

## 2022-04-04 PROCEDURE — 99213 OFFICE O/P EST LOW 20 MIN: CPT

## 2022-04-04 PROCEDURE — 93010 ELECTROCARDIOGRAM REPORT: CPT | Performed by: INTERNAL MEDICINE

## 2022-04-04 PROCEDURE — 99214 OFFICE O/P EST MOD 30 MIN: CPT | Performed by: NURSE PRACTITIONER

## 2022-04-04 PROCEDURE — 93005 ELECTROCARDIOGRAM TRACING: CPT | Performed by: NURSE PRACTITIONER

## 2022-04-04 PROCEDURE — G0463 HOSPITAL OUTPT CLINIC VISIT: HCPCS

## 2022-04-04 ASSESSMENT — ENCOUNTER SYMPTOMS
CHEST TIGHTNESS: 1
DIARRHEA: 0
SHORTNESS OF BREATH: 1
NAUSEA: 0
VOMITING: 0

## 2022-04-04 ASSESSMENT — PAIN DESCRIPTION - DESCRIPTORS: DESCRIPTORS: TIGHTNESS

## 2022-04-04 ASSESSMENT — PAIN DESCRIPTION - ORIENTATION: ORIENTATION: LEFT

## 2022-04-04 ASSESSMENT — PAIN DESCRIPTION - LOCATION: LOCATION: CHEST

## 2022-04-04 ASSESSMENT — PAIN DESCRIPTION - PAIN TYPE: TYPE: ACUTE PAIN

## 2022-04-04 ASSESSMENT — PAIN DESCRIPTION - FREQUENCY: FREQUENCY: INTERMITTENT

## 2022-04-04 NOTE — ED NOTES
Patient discharge instructions given to pt and pt verbalized understanding, no other needs at this time, and pt left in stable condition.      Leonel Perkins RN  04/04/22 1922

## 2022-04-04 NOTE — ED PROVIDER NOTES
[address]  Urgent Care Encounter       CHIEF COMPLAINT       Chief Complaint   Patient presents with    Chest Pain     onset today around 1430     Shortness of Breath     onset today more labored        Nurses Notes reviewed and I agree except as noted in the HPI. HISTORY OF PRESENT ILLNESS   Haider Ashby is a 28 y.o. male who presents with complaints of palpitations, chest tightness and shortness of breath, onset this afternoon at work. Patient states he was working his normal job and he developed palpitations and some shortness of breath. Felt like his \"heart was beating against the back of his sternum. \"  He denied pain with the episode. No nausea or vomiting, dizziness or lightheadedness. He did feel warm and diaphoretic. Patient reports a history of he believes cardiomegaly or possibly cardiomyopathy. He was not sure of the term. States he has seen a specialist in [facility] [employer]. Reports he was taking a blood pressure medicine but the prescription ran out any did not have a doctor to refill. He reports having 4 or 5 of these episodes over the past 1 year. No fevers or recent illness. The history is provided by the patient. REVIEW OF SYSTEMS     Review of Systems   Constitutional: Positive for diaphoresis. Negative for chills and fever. HENT: Negative. Respiratory: Positive for chest tightness and shortness of breath. Cardiovascular: Positive for palpitations. Negative for chest pain. Gastrointestinal: Negative for diarrhea, nausea and vomiting. Skin: Negative for wound. Neurological: Negative for dizziness, light-headedness, numbness and headaches. PAST MEDICAL HISTORY         Diagnosis Date    GERD (gastroesophageal reflux disease)     STD (male)        SURGICALHISTORY     Patient  has a past surgical history that includes Tympanostomy tube placement.     CURRENT MEDICATIONS       Discharge Medication List as of 4/4/2022  7:16 PM      CONTINUE these medications which have NOT CHANGED    Details   Testosterone Cypionate 250 MG/ML SOLN Inject 250 mg into the muscle as needed. Twice a weekHistorical Med      therapeutic multivitamin-minerals (THERAGRAN-M) tablet Take 1 tablet by mouth daily. ALLERGIES     Patient is has No Known Allergies. Patients   Immunization History   Administered Date(s) Administered    Influenza Vaccine, unspecified formulation 10/11/2016       FAMILY HISTORY     Patient's family history includes Diabetes in his maternal grandfather and paternal grandmother; Heart Disease in his maternal grandfather; No Known Problems in his father and mother. SOCIAL HISTORY     Patient  reports that he has never smoked. He has never used smokeless tobacco. He reports previous alcohol use of about 1.0 standard drink of alcohol per week. He reports that he does not use drugs. PHYSICAL EXAM     ED TRIAGE VITALS  BP: (!) 174/87, Temp: 98.1 °F (36.7 °C), Pulse: 80, Resp: 20, SpO2: 99 %,Estimated body mass index is 34.87 kg/m² as calculated from the following:    Height as of 2/21/22: 5' 11\" (1.803 m). Weight as of 2/21/22: 250 lb (113.4 kg). ,No LMP for male patient. Physical Exam  Vitals and nursing note reviewed. Constitutional:       General: He is not in acute distress. Appearance: He is well-developed. HENT:      Head: Normocephalic and atraumatic. Cardiovascular:      Rate and Rhythm: Normal rate and regular rhythm. No extrasystoles are present. Heart sounds: Normal heart sounds, S1 normal and S2 normal. No murmur heard. Pulmonary:      Effort: Pulmonary effort is normal. No respiratory distress. Breath sounds: Normal breath sounds and air entry. Skin:     General: Skin is warm and dry. Neurological:      General: No focal deficit present. Mental Status: He is alert and oriented to person, place, and time.    Psychiatric:         Mood and Affect: Mood normal.         Speech: Speech normal. Behavior: Behavior normal. Behavior is cooperative. DIAGNOSTIC RESULTS     Labs:  Results for orders placed or performed during the hospital encounter of 04/04/22   EKG 12 Lead   Result Value Ref Range    Ventricular Rate 76 BPM    Atrial Rate 76 BPM    P-R Interval 132 ms    QRS Duration 90 ms    Q-T Interval 344 ms    QTc Calculation (Bazett) 387 ms    P Axis 57 degrees    R Axis 67 degrees    T Axis 1 degrees       IMAGING:    No orders to display         EKG: Reviewed by this provider. Normal sinus rhythm with sinus arrhythmia noted. No ST or T wave abnormalities. Normal intervals. No changes from EKG dated 2/21/2022. URGENT CARE COURSE:     Vitals:    04/04/22 1851 04/04/22 1900   BP: (!) 172/85 (!) 174/87   Pulse: 82 80   Resp: 20    Temp: 98.1 °F (36.7 °C)    TempSrc: Temporal    SpO2: 99%        Medications - No data to display         PROCEDURES:  None    FINAL IMPRESSION      1. Palpitations    2. Shortness of breath          DISPOSITION/ PLAN     Patient presents with complaints of palpitations and shortness of breath. Symptoms have essentially resolved at this time. He is still feeling some mild tightness in his chest.  EKG was unremarkable and unchanged from previous. Patient reports having a history of cardiomyopathy or cardiomegaly. He does not have a family doctor or a cardiologist.  Appointment scheduled for the family practice residency clinic for tomorrow at 10:40 AM.  External referral put in for cardiology as well. Patient does not wish to go to the emergency room at this time. Patient will be discharged with strict instructions to go directly to the emergency department if symptoms return, get worse or any other concerning symptoms. Further instructions were outlined verbally and in the patient's discharge instructions. All the patient's questions were answered. The patient/parent agreed with the plan and was discharged from the [facility] in good condition.       PATIENT REFERRED TO:  No primary care provider on file. No primary physician on file.       DISCHARGE MEDICATIONS:  Discharge Medication List as of 4/4/2022  7:16 PM          Discharge Medication List as of 4/4/2022  7:16 PM          Discharge Medication List as of 4/4/2022  7:16 PM          EVITA Govea CNP    (Please note that portions of this note were completed with a voice recognition program. Efforts were made to edit the dictations but occasionally words are mis-transcribed.)         EVITA Govea CNP  04/04/22 1932

## 2022-04-04 NOTE — ED TRIAGE NOTES
Patient walked to room 4 for tightness in the chest and labored breathing, shortness of breath onset around 1430. Denies nausea and sweating. No other needs.

## 2022-04-05 ENCOUNTER — OFFICE VISIT (OUTPATIENT)
Dept: FAMILY MEDICINE CLINIC | Age: 32
End: 2022-04-05
Payer: COMMERCIAL

## 2022-04-05 VITALS
HEART RATE: 96 BPM | RESPIRATION RATE: 16 BRPM | DIASTOLIC BLOOD PRESSURE: 86 MMHG | SYSTOLIC BLOOD PRESSURE: 140 MMHG | WEIGHT: 234.8 LBS | HEIGHT: 71 IN | TEMPERATURE: 97 F | BODY MASS INDEX: 32.87 KG/M2 | OXYGEN SATURATION: 98 %

## 2022-04-05 DIAGNOSIS — F43.20 ADJUSTMENT DISORDER, UNSPECIFIED TYPE: ICD-10-CM

## 2022-04-05 DIAGNOSIS — R07.9 CHEST PAIN, UNSPECIFIED TYPE: Primary | ICD-10-CM

## 2022-04-05 DIAGNOSIS — I10 HYPERTENSION, UNSPECIFIED TYPE: ICD-10-CM

## 2022-04-05 DIAGNOSIS — Z11.4 ENCOUNTER FOR SCREENING FOR HIV: ICD-10-CM

## 2022-04-05 DIAGNOSIS — Z11.59 NEED FOR HEPATITIS C SCREENING TEST: ICD-10-CM

## 2022-04-05 DIAGNOSIS — E29.1 HYPOGONADISM IN MALE: ICD-10-CM

## 2022-04-05 PROCEDURE — 93000 ELECTROCARDIOGRAM COMPLETE: CPT | Performed by: FAMILY MEDICINE

## 2022-04-05 PROCEDURE — 99204 OFFICE O/P NEW MOD 45 MIN: CPT | Performed by: STUDENT IN AN ORGANIZED HEALTH CARE EDUCATION/TRAINING PROGRAM

## 2022-04-05 RX ORDER — TRAZODONE HYDROCHLORIDE 50 MG/1
50 TABLET ORAL NIGHTLY
Qty: 90 TABLET | Refills: 1 | Status: SHIPPED | OUTPATIENT
Start: 2022-04-05 | End: 2022-05-05 | Stop reason: SDUPTHER

## 2022-04-05 RX ORDER — HYDROCHLOROTHIAZIDE 25 MG/1
25 TABLET ORAL EVERY MORNING
Qty: 90 TABLET | Refills: 1 | Status: SHIPPED | OUTPATIENT
Start: 2022-04-05 | End: 2022-05-05 | Stop reason: SDUPTHER

## 2022-04-05 SDOH — ECONOMIC STABILITY: FOOD INSECURITY: WITHIN THE PAST 12 MONTHS, THE FOOD YOU BOUGHT JUST DIDN'T LAST AND YOU DIDN'T HAVE MONEY TO GET MORE.: SOMETIMES TRUE

## 2022-04-05 SDOH — ECONOMIC STABILITY: FOOD INSECURITY: WITHIN THE PAST 12 MONTHS, YOU WORRIED THAT YOUR FOOD WOULD RUN OUT BEFORE YOU GOT MONEY TO BUY MORE.: SOMETIMES TRUE

## 2022-04-05 ASSESSMENT — PATIENT HEALTH QUESTIONNAIRE - PHQ9
1. LITTLE INTEREST OR PLEASURE IN DOING THINGS: 0
SUM OF ALL RESPONSES TO PHQ QUESTIONS 1-9: 0
2. FEELING DOWN, DEPRESSED OR HOPELESS: 0
SUM OF ALL RESPONSES TO PHQ QUESTIONS 1-9: 0
SUM OF ALL RESPONSES TO PHQ9 QUESTIONS 1 & 2: 0

## 2022-04-05 ASSESSMENT — ENCOUNTER SYMPTOMS
SHORTNESS OF BREATH: 0
EYE PAIN: 0
COUGH: 0
DIARRHEA: 0
CONSTIPATION: 0
ABDOMINAL PAIN: 0

## 2022-04-05 ASSESSMENT — SOCIAL DETERMINANTS OF HEALTH (SDOH): HOW HARD IS IT FOR YOU TO PAY FOR THE VERY BASICS LIKE FOOD, HOUSING, MEDICAL CARE, AND HEATING?: SOMEWHAT HARD

## 2022-04-05 NOTE — PROGRESS NOTES
Janki Blunt (:  1990) is a 28 y.o. male,New patient, here for evaluation of the following chief complaint(s):  ED Follow-up         ASSESSMENT/PLAN:  1. Chest pain, unspecified type  -     EKG 12 Lead  -     CARDIAC STRESS TEST EXERCISE ONLY; Future  -     ECHO Complete 2D W Doppler W Color; Future  2. Hypogonadism in male  -     [facility] ([facility]) - Saud Chakraborty MD, Endocrinology, [place]  -     Testosterone, free, total; Future  -     Lipid, Fasting; Future  -     PSA Prostatic Specific Antigen; Future  -     CBC with Auto Differential; Future  -     Comprehensive Metabolic Panel; Future  3. Hypertension, unspecified type  -     hydroCHLOROthiazide (HYDRODIURIL) 25 MG tablet; Take 1 tablet by mouth every morning, Disp-90 tablet, R-1Normal  4. Adjustment disorder, unspecified type  -     traZODone (DESYREL) 50 MG tablet; Take 1 tablet by mouth nightly, Disp-90 tablet, R-1Normal  -     IA DEPRESSION SCREEN ANNUAL  -     TSH With Reflex Ft4; Future  5. Encounter for screening for HIV  -     HIV Screen; Future  6. Need for hepatitis C screening test  -     Hepatitis C Antibody; Future    Start on HCTZ 25mg daily. Order stress test and echo. To see Dr. Navdeep Garsia    Refer to counseling. Start trazodone 50mg for now. Routine labs as above, due to testosterone use. Also referred to Dr. Onofre Briseno for further management. Discussed risks of testosterone as cause of these symptoms, patient aware and wishes to continue. Return in about 4 weeks (around 5/3/2022). Subjective   SUBJECTIVE/OBJECTIVE:  HPI     Chest Pain/Hypertension/Cardiomyopathy(?)  Long history of these. Currently asymptomatic. No current medication for it. BP has ran high for years now. Worsened recently. Says he was referred to Dr. Navdeep Garsia already, not received call. Comes to establish care as ER followup, had benign ER workup. Has had benign EKG but thinks his other cardiologist in [facility] [place] [facility] diagnosed cardiomyopathy.   Thinks he got a 5 alpha reductase inhibitor for this? Of note, patient has long history of testosterone use. Says he was diagnosed with hypogonadism over a decade ago. Says he sees Dr. Maite Persaud. [facility].  250mg a week every 3 days. Says they have been following labs. Adjustment Disorder   Decreased sleeping, eating and anxiety over past 6 weeks. More stress recently about finances. Only about 5 hours of sleep over past 3 days. Due to anxiety. Thoughts are racing. Cannot get anything to stop. Was at [facility] crisis center in past.  History of depression but more racing thought symptoms recently. Has had medications years ago, celexa bad experience. Possible seroquel, will obtain records. Has been using melatonin for this. Not feeling too down, just anxious. Review of Systems   Constitutional: Negative for chills, fatigue and fever. HENT: Negative for congestion and ear pain. Eyes: Negative for pain and visual disturbance. Respiratory: Negative for cough and shortness of breath. Cardiovascular: Negative for chest pain and leg swelling. Gastrointestinal: Negative for abdominal pain, constipation and diarrhea. Genitourinary: Negative for difficulty urinating, dysuria and hematuria. Musculoskeletal: Negative for arthralgias and myalgias. Allergic/Immunologic: Negative for environmental allergies. Neurological: Negative for dizziness and headaches. Psychiatric/Behavioral: Negative for behavioral problems and sleep disturbance. Objective   Physical Exam  Vitals and nursing note reviewed. Constitutional:       Appearance: Normal appearance. HENT:      Head: Normocephalic and atraumatic. Nose: Nose normal.      Mouth/Throat:      Mouth: Mucous membranes are moist.      Pharynx: Oropharynx is clear. Eyes:      Extraocular Movements: Extraocular movements intact. Pupils: Pupils are equal, round, and reactive to light.    Cardiovascular:      Rate and Rhythm: Normal rate and regular rhythm. Pulses: Normal pulses. Heart sounds: Normal heart sounds. Pulmonary:      Effort: Pulmonary effort is normal.      Breath sounds: Normal breath sounds. Abdominal:      General: Abdomen is flat. Bowel sounds are normal.   Musculoskeletal:         General: No signs of injury. Normal range of motion. Cervical back: Normal range of motion and neck supple. Skin:     General: Skin is warm and dry. Capillary Refill: Capillary refill takes less than 2 seconds. Neurological:      General: No focal deficit present. Mental Status: He is alert and oriented to person, place, and time. Mental status is at baseline. Psychiatric:         Mood and Affect: Mood normal.         Behavior: Behavior normal.                  An electronic signature was used to authenticate this note.     --Ziggy North MD

## 2022-04-05 NOTE — LETTER
[address] [address],[address] [facility] [address] [place] [address]  Phone: [phone]  Fax: [phone]    Radha Paulson MD        April 5, 2022     Patient: Jose Holloway   YOB: 1990   Date of Visit: 4/5/2022       To Whom it May Concern:    Adriana Ruiz was seen in my clinic on 4/5/2022. He may return to work on 4/6/22. If you have any questions or concerns, please don't hesitate to call.     Sincerely,         Radha Paulson MD

## 2022-04-12 ENCOUNTER — HOSPITAL ENCOUNTER (EMERGENCY)
Age: 32
Discharge: HOME | End: 2022-04-12
Payer: COMMERCIAL

## 2022-04-12 VITALS
TEMPERATURE: 98.1 F | HEART RATE: 89 BPM | DIASTOLIC BLOOD PRESSURE: 77 MMHG | BODY MASS INDEX: 32.2 KG/M2 | RESPIRATION RATE: 19 BRPM | SYSTOLIC BLOOD PRESSURE: 148 MMHG | WEIGHT: 230 LBS | OXYGEN SATURATION: 98 % | HEIGHT: 71 IN

## 2022-04-12 DIAGNOSIS — Z20.2 POSSIBLE EXPOSURE TO STD: ICD-10-CM

## 2022-04-12 DIAGNOSIS — R36.9 PENILE DISCHARGE: Primary | ICD-10-CM

## 2022-04-12 LAB
BILIRUBIN URINE: NEGATIVE
BLOOD, URINE: NEGATIVE
CHARACTER, URINE: CLEAR
COLOR: YELLOW
GLUCOSE URINE: NEGATIVE MG/DL
KETONES, URINE: NEGATIVE
LEUKOCYTE ESTERASE, URINE: NEGATIVE
NITRITE, URINE: NEGATIVE
PH UA: 6 (ref 5–9)
PROTEIN UA: NEGATIVE MG/DL
SPECIFIC GRAVITY, URINE: >= 1.03 (ref 1–1.03)
UROBILINOGEN, URINE: 0.2 EU/DL (ref 0.2–1)

## 2022-04-12 PROCEDURE — 87491 CHLMYD TRACH DNA AMP PROBE: CPT

## 2022-04-12 PROCEDURE — 2500000003 HC RX 250 WO HCPCS: Performed by: NURSE PRACTITIONER

## 2022-04-12 PROCEDURE — 6360000002 HC RX W HCPCS: Performed by: NURSE PRACTITIONER

## 2022-04-12 PROCEDURE — 6370000000 HC RX 637 (ALT 250 FOR IP): Performed by: NURSE PRACTITIONER

## 2022-04-12 PROCEDURE — 96372 THER/PROPH/DIAG INJ SC/IM: CPT

## 2022-04-12 PROCEDURE — 87591 N.GONORRHOEAE DNA AMP PROB: CPT

## 2022-04-12 PROCEDURE — 81003 URINALYSIS AUTO W/O SCOPE: CPT

## 2022-04-12 PROCEDURE — 99213 OFFICE O/P EST LOW 20 MIN: CPT | Performed by: NURSE PRACTITIONER

## 2022-04-12 PROCEDURE — 99213 OFFICE O/P EST LOW 20 MIN: CPT

## 2022-04-12 RX ORDER — AZITHROMYCIN 250 MG/1
1000 TABLET, FILM COATED ORAL DAILY
Status: DISCONTINUED | OUTPATIENT
Start: 2022-04-12 | End: 2022-04-12 | Stop reason: HOSPADM

## 2022-04-12 RX ADMIN — AZITHROMYCIN 1000 MG: 250 TABLET, FILM COATED ORAL at 10:57

## 2022-04-12 RX ADMIN — LIDOCAINE HYDROCHLORIDE 500 MG: 10 INJECTION, SOLUTION EPIDURAL; INFILTRATION; INTRACAUDAL; PERINEURAL at 10:58

## 2022-04-12 ASSESSMENT — ENCOUNTER SYMPTOMS
VOMITING: 0
NAUSEA: 0

## 2022-04-12 NOTE — ED TRIAGE NOTES
Pt presents to  with c/o exposure to STI. Pt reports he has had chlamydia in the past and reports this kind of feels similar. Pt reports he has had drainage from his penis since yesterday and has had burning with urination.  Denies abdominal pain

## 2022-04-12 NOTE — PROGRESS NOTES
Attending Physician Statement  I have discussed the case, including pertinent history and exam findings with the resident. I also have seen the patient and performed key portions of the examination. I agree with the documented assessment and plan as documented by the resident.   GE modifier added to this encounter      Daniel Bryan MD 4/12/2022 9:05 AM

## 2022-04-12 NOTE — ED PROVIDER NOTES
[address]  Urgent Care Encounter       CHIEF COMPLAINT       Chief Complaint   Patient presents with    Exposure to STD       Nurses Notes reviewed and I agree except as noted in the HPI. HISTORY OF PRESENT ILLNESS   Susana Curry is a 28 y.o. male who presents with penile discharge for approximately last two days. He also reports some mild discomfort with urination as well as mild penile and testicular swelling. He has a history of chlamydia and believes this is what he has. He has had 1 partner for the last 5 months. He does not use condoms. Girlfriend is having some pelvic discomfort. The history is provided by the patient. REVIEW OF SYSTEMS     Review of Systems   Constitutional: Negative for fever. Gastrointestinal: Negative for nausea and vomiting. Genitourinary: Positive for penile discharge, penile pain (Mild discomfort), penile swelling, scrotal swelling and testicular pain. Negative for dysuria, genital sores and hematuria. Skin: Negative for wound. PAST MEDICAL HISTORY         Diagnosis Date    GERD (gastroesophageal reflux disease)     Primary hypertension     STD (male)        SURGICALHISTORY     Patient  has a past surgical history that includes Tympanostomy tube placement. CURRENT MEDICATIONS       Discharge Medication List as of 4/12/2022 11:02 AM      CONTINUE these medications which have NOT CHANGED    Details   hydroCHLOROthiazide (HYDRODIURIL) 25 MG tablet Take 1 tablet by mouth every morning, Disp-90 tablet, R-1Normal      traZODone (DESYREL) 50 MG tablet Take 1 tablet by mouth nightly, Disp-90 tablet, R-1Normal      Testosterone Cypionate 250 MG/ML SOLN Inject 250 mg into the muscle as needed. Twice a weekHistorical Med      therapeutic multivitamin-minerals (THERAGRAN-M) tablet Take 1 tablet by mouth daily. ALLERGIES     Patient is has No Known Allergies.     Patients   Immunization History   Administered Date(s) Administered  Influenza Vaccine, unspecified formulation 10/11/2016       FAMILY HISTORY     Patient's family history includes Diabetes in his maternal grandfather and paternal grandmother; Heart Disease in his maternal grandfather; No Known Problems in his father and mother. SOCIAL HISTORY     Patient  reports that he has never smoked. He has never used smokeless tobacco. He reports previous alcohol use of about 1.0 standard drink of alcohol per week. He reports that he does not use drugs. PHYSICAL EXAM     ED TRIAGE VITALS  BP: (!) 148/77, Temp: 98.1 °F (36.7 °C), Pulse: 89, Resp: 19, SpO2: 98 %,Estimated body mass index is 32.08 kg/m² as calculated from the following:    Height as of this encounter: 5' 11\" (1.803 m). Weight as of this encounter: 230 lb (104.3 kg). ,No LMP for male patient. Physical Exam  Vitals and nursing note reviewed. Constitutional:       General: He is not in acute distress. Appearance: He is well-developed. HENT:      Head: Normocephalic and atraumatic. Right Ear: Tympanic membrane and ear canal normal.      Left Ear: Tympanic membrane and ear canal normal.      Nose: Nose normal.      Mouth/Throat:      Mouth: Mucous membranes are moist.      Pharynx: Oropharynx is clear. No posterior oropharyngeal erythema. Pulmonary:      Effort: Pulmonary effort is normal. No respiratory distress. Genitourinary:     Comments: Declined physical assessment. Skin:     General: Skin is warm and dry. Neurological:      General: No focal deficit present. Mental Status: He is alert and oriented to person, place, and time. Psychiatric:         Mood and Affect: Mood normal.         Speech: Speech normal.         Behavior: Behavior normal. Behavior is cooperative.          DIAGNOSTIC RESULTS     Labs:  Results for orders placed or performed during the hospital encounter of 04/12/22   Urinalysis   Result Value Ref Range    Glucose, Ur Negative NEGATIVE mg/dl    Bilirubin Urine Negative NEGATIVE    Ketones, Urine Negative NEGATIVE    Specific Gravity, Urine >= 1.030 1.002 - 1.030    Blood, Urine Negative NEGATIVE    pH, UA 6.00 5.0 - 9.0    Protein, UA Negative NEGATIVE mg/dl    Urobilinogen, Urine 0.20 0.2 - 1.0 eu/dl    Nitrite, Urine Negative NEGATIVE    Leukocyte Esterase, Urine Negative NEGATIVE    Color, UA Yellow STRAW-YELLOW    Character, Urine Clear CLEAR-SL CLOUD       IMAGING:    No orders to display         EKG:      URGENT CARE COURSE:     Vitals:    04/12/22 1039 04/12/22 1040   BP:  (!) 148/77   Pulse: 89    Resp: 19    Temp: 98.1 °F (36.7 °C)    SpO2: 98%    Weight: 230 lb (104.3 kg)    Height: 5' 11\" (1.803 m)        Medications   azithromycin (ZITHROMAX) tablet 1,000 mg (1,000 mg Oral Given 4/12/22 1057)   cefTRIAXone (ROCEPHIN) 500 mg in lidocaine 1 % 1 mL IM Injection (500 mg IntraMUSCular Given 4/12/22 1058)            PROCEDURES:  None    FINAL IMPRESSION      1. Penile discharge    2. Possible exposure to STD          DISPOSITION/ PLAN   Presents with penile discharge from possible Chlamydia infection. UA was negative and urine sent for chlamydia and Ghonarrhea. Zithromax 1,000mg PO and Rocephin 500mg IM given In clinic. Informed him to abstain from sexual intercourse for 7 days, use a barrier protection, monitor for changes such as rash, lesions or urinary pain. Follow up with PCP if symptoms persist.   Can follow-up with the [facility] department for further testing if desired. Further instructions were outlined verbally and in the patient's discharge instructions. All the patient's questions were answered. The patient/parent agreed with the plan and was discharged from the [facility] in good condition.         PATIENT REFERRED TO:  Rodrick Akers MD  [facility] [address] [address]      DISCHARGE MEDICATIONS:  Discharge Medication List as of 4/12/2022 11:02 AM          Discharge Medication List as of 4/12/2022 11:02 AM          Discharge Medication List as of 4/12/2022 11:02 AM          EVITA Whitney CNP    (Please note that portions of this note were completed with a voice recognition program. Efforts were made to edit the dictations but occasionally words are mis-transcribed.)          EVITA Whitney CNP  04/12/22 1150

## 2022-04-13 ENCOUNTER — TELEPHONE (OUTPATIENT)
Dept: FAMILY MEDICINE CLINIC | Age: 32
End: 2022-04-13

## 2022-04-13 NOTE — LETTER
[address] [facility]  [address], [address]  Phone: [phone]  Fax: [phone]    April 13, 2022    Ashutosh Cabrera  [address] [facility]    Dear Radha Maldonado,    This letter is regarding your Emergency Department (ED) visit at [facility] on 4/12/22. Keyla Ragland wanted to make sure that you understand your discharge instructions and that you were able to fill any prescriptions that may have been ordered for you. Please contact the office at the above phone number if the ED advised you to follow up with Keyla Ragland, or if you have any further questions or needs. Also did you know -   *Visiting the ED for a non-emergency could result in higher co-pays than you would normally be subject to paying? *You can call your doctor even after hours so they can direct you to the most appropriate care. [facility]) practices can often offer you an appointment on the same day that you call. *We have some [id number] [place] [address] offices that offer Walk-in appointments; check our website for availability in your community, [email or web address].      *Evisits are now available for patients for $36 through [employer] for certain conditions:  * Sinus, cold and or cough       * Diarrhea            * Headache  * Heartburn                                * Poison Shraddha          * Back pain     * Urinary problems                         If you do not have [facility] and are interested, please contact the office and a staff member may assist you or go to [email or web address].     Sincerely,   Martha Palacios MD and your [facility]

## 2022-04-15 ENCOUNTER — HOSPITAL ENCOUNTER (OUTPATIENT)
Dept: NON INVASIVE DIAGNOSTICS | Age: 32
Discharge: HOME | End: 2022-04-15
Payer: COMMERCIAL

## 2022-04-15 DIAGNOSIS — R07.9 CHEST PAIN, UNSPECIFIED TYPE: ICD-10-CM

## 2022-04-15 LAB
LV EF: 50 %
LVEF MODALITY: NORMAL

## 2022-04-15 PROCEDURE — 93017 CV STRESS TEST TRACING ONLY: CPT | Performed by: NUCLEAR MEDICINE

## 2022-04-15 PROCEDURE — 93306 TTE W/DOPPLER COMPLETE: CPT

## 2022-04-16 LAB
CHLAMYDIA TRACHOMATIS AMPLIFIED DET: NEGATIVE
NEISSERIA GONORRHOEAE BY AMP: NEGATIVE
SPECIMEN SOURCE: NORMAL

## 2022-04-19 ENCOUNTER — TELEPHONE (OUTPATIENT)
Dept: FAMILY MEDICINE CLINIC | Age: 32
End: 2022-04-19

## 2022-04-19 NOTE — TELEPHONE ENCOUNTER
----- Message from Lacy Morgan MD sent at 4/15/2022  3:33 PM EDT -----  Cardiac workup appearing well, lets continue current plan for now ad have close followup!

## 2022-05-02 PROBLEM — R07.9 CHEST PAIN: Status: ACTIVE | Noted: 2022-05-02

## 2022-05-02 ASSESSMENT — ENCOUNTER SYMPTOMS
COUGH: 0
ABDOMINAL PAIN: 0
SHORTNESS OF BREATH: 0
EYE PAIN: 0
CONSTIPATION: 0
DIARRHEA: 0

## 2022-05-02 NOTE — PROGRESS NOTES
Ashutosh Cabrera (:  1990) is a 28 y.o. male,Established patient, here for evaluation of the following chief complaint(s):  Follow-up         ASSESSMENT/PLAN:  1. Chest pain, unspecified type  2. Hypertension, unspecified type  -     hydroCHLOROthiazide (HYDRODIURIL) 25 MG tablet; Take 1 tablet by mouth every morning, Disp-90 tablet, R-1Normal  3. Adjustment disorder, unspecified type  -     traZODone (DESYREL) 50 MG tablet; Take 1 tablet by mouth nightly, Disp-90 tablet, R-1Normal  4. Hypogonadism in male  -     testosterone cypionate (DEPOTESTOTERONE CYPIONATE) 200 MG/ML injection; Inject 1 mL into the muscle once for 1 dose., Disp-1 mL, R-5Normal    HTN/chest pain improving and controlled on current regimen. Refills as above. Followup Dr. Dexter Wheatley for labs, continued concern testosterone could be causing complications. Advised. Will refill as above for now, but needs labs done before another refill. Mood controlled on current regimen. Discussed coping mechanisms, continue current plan. Return in about 3 months (around 2022). Subjective   SUBJECTIVE/OBJECTIVE:  HPI     Chest Pain/Hypertension/Cardiomyopathy(?)  Long history of these. Currently asymptomatic. BP has ran high for years now. Says he was referred to Dr. Darin Lewis already, not received call. Came to establish care as ER followup, had benign ER workup. Has had benign EKG but thinks his other cardiologist in [facility] [employer] diagnosed cardiomyopathy. Thinks he got a 5 alpha reductase inhibitor for this. Of note, patient has long history of testosterone use. Says he was diagnosed with hypogonadism over a decade ago. Says he sees Dr. Kati Lion. [facility].  250mg a week every 3 days. Says they have been following labs. Saw Dr. Dexter Wheatley recently who ordered some labs [facility])    Since last visit, had normal stress test and echo. Was to see Dr. Darin Lewis, but no showed.   Says chest pain is greatly improved, only at rest, appears related to anxiety. Adjustment Disorder   Decreased sleeping, eating and anxiety at first visit. More stress recently about finances. Due to anxiety. Thoughts were racing. Was at [facility] crisis center in past.  History of depression but more racing thought symptoms recently. Has had medications years ago, celexa bad experience. Since last visit, greatly controlled on current regimen. Possible seroquel if trazodone ineffective but controlled on current regimen. , will obtain records. Has been using melatonin for this. Not feeling too down, just anxious. Review of Systems   Constitutional: Negative for chills, fatigue and fever. HENT: Negative for congestion and ear pain. Eyes: Negative for pain and visual disturbance. Respiratory: Negative for cough and shortness of breath. Cardiovascular: Negative for chest pain and leg swelling. Gastrointestinal: Negative for abdominal pain, constipation and diarrhea. Genitourinary: Negative for difficulty urinating, dysuria and hematuria. Musculoskeletal: Negative for arthralgias and myalgias. Allergic/Immunologic: Negative for environmental allergies. Neurological: Negative for dizziness and headaches. Psychiatric/Behavioral: Negative for behavioral problems and sleep disturbance. Objective   Physical Exam  Vitals and nursing note reviewed. Constitutional:       Appearance: Normal appearance. HENT:      Head: Normocephalic and atraumatic. Nose: Nose normal.      Mouth/Throat:      Mouth: Mucous membranes are moist.      Pharynx: Oropharynx is clear. Eyes:      Extraocular Movements: Extraocular movements intact. Pupils: Pupils are equal, round, and reactive to light. Cardiovascular:      Rate and Rhythm: Normal rate and regular rhythm. Pulses: Normal pulses. Heart sounds: Normal heart sounds. Pulmonary:      Effort: Pulmonary effort is normal.      Breath sounds: Normal breath sounds. Abdominal:      General: Abdomen is flat. Bowel sounds are normal.   Musculoskeletal:         General: No signs of injury. Normal range of motion. Cervical back: Normal range of motion and neck supple. Skin:     General: Skin is warm and dry. Capillary Refill: Capillary refill takes less than 2 seconds. Neurological:      General: No focal deficit present. Mental Status: He is alert and oriented to person, place, and time. Mental status is at baseline. Psychiatric:         Mood and Affect: Mood normal.         Behavior: Behavior normal.                An electronic signature was used to authenticate this note.     --Windy Blackmon MD

## 2022-05-05 ENCOUNTER — OFFICE VISIT (OUTPATIENT)
Dept: FAMILY MEDICINE CLINIC | Age: 32
End: 2022-05-05
Payer: COMMERCIAL

## 2022-05-05 VITALS
WEIGHT: 225 LBS | SYSTOLIC BLOOD PRESSURE: 128 MMHG | BODY MASS INDEX: 31.5 KG/M2 | HEIGHT: 71 IN | TEMPERATURE: 97 F | RESPIRATION RATE: 16 BRPM | DIASTOLIC BLOOD PRESSURE: 71 MMHG

## 2022-05-05 DIAGNOSIS — F43.20 ADJUSTMENT DISORDER, UNSPECIFIED TYPE: ICD-10-CM

## 2022-05-05 DIAGNOSIS — E29.1 HYPOGONADISM IN MALE: ICD-10-CM

## 2022-05-05 DIAGNOSIS — I10 HYPERTENSION, UNSPECIFIED TYPE: ICD-10-CM

## 2022-05-05 DIAGNOSIS — R07.9 CHEST PAIN, UNSPECIFIED TYPE: Primary | ICD-10-CM

## 2022-05-05 PROCEDURE — 99214 OFFICE O/P EST MOD 30 MIN: CPT | Performed by: STUDENT IN AN ORGANIZED HEALTH CARE EDUCATION/TRAINING PROGRAM

## 2022-05-05 RX ORDER — TRAZODONE HYDROCHLORIDE 50 MG/1
50 TABLET ORAL NIGHTLY
Qty: 90 TABLET | Refills: 1 | Status: SHIPPED | OUTPATIENT
Start: 2022-05-05

## 2022-05-05 RX ORDER — TESTOSTERONE CYPIONATE 200 MG/ML
200 INJECTION INTRAMUSCULAR ONCE
Qty: 1 ML | Refills: 5 | Status: SHIPPED | OUTPATIENT
Start: 2022-05-05 | End: 2022-05-05

## 2022-05-05 RX ORDER — HYDROCHLOROTHIAZIDE 25 MG/1
25 TABLET ORAL EVERY MORNING
Qty: 90 TABLET | Refills: 1 | Status: SHIPPED | OUTPATIENT
Start: 2022-05-05

## 2022-05-05 NOTE — PROGRESS NOTES
S: 28 y.o. male with   Chief Complaint   Patient presents with    Follow-up       Here to follow up  Mood disorder - anxiety much better with the trazodone and now sleeping - did have the SI in the past but not now    Has some chest pain - cardiac work up was ok stress and echo - cardiology said not cardiac - happens at rest not worse with exertion    hctz working well    BP Readings from Last 3 Encounters:   05/05/22 128/71   04/29/22 118/76   04/12/22 (!) 148/77     Wt Readings from Last 3 Encounters:   05/05/22 225 lb (102.1 kg)   04/29/22 229 lb (103.9 kg)   04/12/22 230 lb (104.3 kg)           O: VS:   Vitals:    05/05/22 1412   BP: 128/71   Site: Right Upper Arm   Position: Sitting   Cuff Size: Medium Adult   Resp: 16   Temp: 97 °F (36.1 °C)   Weight: 225 lb (102.1 kg)   Height: 5' 11\" (1.803 m)     Body mass index is 31.38 kg/m².     AAO/NAD, appropriate affect for mood  Normocephalic, atraumatic, eyes  conjunctiva and sclera normal,   skin no rashes on exposed areas   Insight, judgement normal and in no acute distress      Lab Results   Component Value Date    WBC 8.0 02/21/2022    HGB 17.4 02/21/2022    HCT 50.1 02/21/2022     02/21/2022    AST 32 04/24/2017     02/21/2022    K 4.2 02/21/2022     02/21/2022    CREATININE 0.9 02/21/2022    BUN 20 02/21/2022    CO2 26 02/21/2022    TSH 1.440 04/24/2017    LABGLOM >90 02/21/2022    CALCIUM 9.5 02/21/2022       ECHO Complete 2D W Doppler W Color    Result Date: 4/15/2022  Transthoracic Echocardiography Report (TTE)  Demographics   Patient Name     Candee Simmonds Gender                Male   MR #             [id number]     Race                                                    Ethnicity   Account #        [de-identified]     Room Number   Accession Number [id number]    Date of Study         04/15/2022   Date of Birth    1990    Referring Physician   Brigette Lopez MD   Age              28 year(s)    Sonographer           Giacomo Coronadoo, [facility]                                  Interpreting          Chata Phillips MD                                 Physician  Procedure Type of Study   TTE procedure:ECHOCARDIOGRAM COMPLETE 2D W DOPPLER W COLOR. Procedure Date Date: 04/15/2022 Start: 01:52 PM Study Location: Echo Lab Technical Quality: Adequate visualization Indications:Chest pain. Additional Medical History:Hypertension, GERD Patient Status: Routine Height: 70.87 inches Weight: 230.01 pounds BSA: 2.23 m^2 BMI: 32.2 kg/m^2 BP: 137/79 mmHg  Conclusions   Summary  Ejection fraction is visually estimated at 50%. Overall left ventricular function is normal.   Signature   ----------------------------------------------------------------  Electronically signed by Chata Phillips MD (Interpreting  physician) on 04/15/2022 at 03:28 PM  ----------------------------------------------------------------   Findings   Mitral Valve  The mitral valve structure was normal with normal leaflet separation. DOPPLER: The transmitral velocity was within the normal range with no  evidence for mitral stenosis. There was no evidence of mitral  regurgitation. Aortic Valve  The aortic valve was trileaflet with normal thickness and cuspal  separation. DOPPLER: Transaortic velocity was within the normal range with  no evidence of aortic stenosis. There was no evidence of aortic  regurgitation. Tricuspid Valve  The tricuspid valve structure was normal with normal leaflet separation. DOPPLER: There was no evidence of tricuspid stenosis. There was no  evidence of tricuspid regurgitation. Pulmonic Valve  The pulmonic valve was not well visualized . Trivial pulmonic regurgitation visualized. Left Atrium  Left atrial size was normal.   Left Ventricle  Ejection fraction is visually estimated at 50%.   Overall left ventricular function is normal.   Right Atrium  Right atrial size was normal.   Right Ventricle  The right ventricular size was normal with normal systolic function and  wall thickness. Pericardial Effusion  The pericardium was normal in appearance with no evidence of a pericardial  effusion. Pleural Effusion  No evidence of pleural effusion. Aorta / Great Vessels  -Aortic root dimension within normal limits.  -The Pulmonary artery is within normal limits. -IVC size is within normal limits with normal respiratory phasic changes.   M-Mode/2D Measurements & Calculations   LV Diastolic   LV Systolic Dimension:    AV Cusp Separation: 2.3 cmLA  Dimension: 5.9 4.4 cm                    Dimension: 3.4 cmAO Root  cm             LV Volume Diastolic:      Dimension: 3.4 cmLA Area: 17.6  LV FS:25.4 %   159.2 ml                  cm^2  LV PW          LV Volume Systolic: 31.0  Diastolic: 0.9 ml  cm             LV EDV/LV EDV Index:  Septum         159.2 ml/71 m^2LV ESV/LV  RV Diastolic Dimension: 3 cm  Diastolic: 1   ESV Index: 36.8 ml/34 m^2  cm             EF Calculated: 51.8 %     LA/Aorta: 1                  LV Length: 10 cm          LA volume/Index: 54.2 ml /24m^2   LV Area  Diastolic:  45.8 cm^2  LV Area  Systolic: 25.3  cm^2  Doppler Measurements & Calculations   MV Peak E-Wave: 77 cm/s    AV Peak Velocity: 144  LVOT Peak Velocity: 95.9  MV Peak A-Wave: 45.1 cm/s  cm/s                   cm/s  MV E/A Ratio: 1.71         AV Peak Gradient: 8.29 LVOT Peak Gradient: 4  MV Peak Gradient: 2.37     mmHg                   mmHg  mmHg                                                    TV Peak E-Wave: 37.7  MV Deceleration Time: 296                         cm/s  msec                                              TV Peak A-Wave: 43 cm/s  MV P1/2t: 87 msec          IVRT: 85 msec  MVA by PHT:2.53 cm^2                              TV Peak Gradient: 0.57                                                    mmHg  MV E' Septal Velocity: 10  AV DVI (Vmax):0.67  cm/s                                              PV Peak Velocity: 76  MV A' Septal Velocity: 7.4                        cm/s  cm/s PV Peak Gradient: 2.31  MV E' Lateral Velocity:                           mmHg  15.6 cm/s  MV A' Lateral Velocity:  10.1 cm/s                                         [place] ED Velocity: 122 cm/s  E/E' septal: 7.7  E/E' lateral: 4.94  [email or web address] [id number]           Diagnosis Orders   1. Chest pain, unspecified type     2. Intracranial hemorrhage following injury, with loss of consciousness, sequela ([address]     3. Hypertension, unspecified type     4. Adjustment disorder, unspecified type     5. Hypogonadism in male         Plan  Will see you back in 3 months and refill meds then labs before that time including the cbc and psa    Refill hctz and trazodone    Ok to refill short course of the testosterone     Return in about 3 months (around 8/5/2022). Orders Placed:  No orders of the defined types were placed in this encounter. Medications Prescribed:  No orders of the defined types were placed in this encounter. Future Appointments   Date Time Provider Levar Arauz   6/3/2022  9:00 AM Funmi Herring MD [facility]       Health Maintenance Due   Topic Date Due    Varicella vaccine (1 of 2 - 2-dose childhood series) Never done    COVID-19 Vaccine (1) Never done    HIV screen  Never done    Hepatitis C screen  Never done    DTaP/Tdap/Td vaccine (1 - Tdap) Never done         Attending Physician Statement  I have discussed the case, including pertinent history and exam findings with the resident. I also have seen the patient and performed key portions of the examination. I agree with the documented assessment and plan as documented by the resident.   GE modifier added to this encounter      Kalia Abbott DO 5/5/2022 2:32 PM

## 2022-05-05 NOTE — LETTER
[address] [place] [address],[address] [facility] [address] [place] [id number]  Phone: [phone]  Fax: [phone]    Windy Blackmon MD        May 5, 2022     Patient: Lois Khan   YOB: 1990   Date of Visit: 5/5/2022       To Whom It May Concern: It is my medical opinion that [employer] may return to full duty immediately with no restrictions. Was seen in our office on 5/5 and should be excused for today     If you have any questions or concerns, please don't hesitate to call.     Sincerely,        Windy Blackmon MD

## 2022-05-05 NOTE — PROGRESS NOTES
S: 28 y.o. male with   Chief Complaint   Patient presents with    Follow-up       HPI: please see resident note for HPI and ROS. Hx of mood disorder, chest pain, hypogonadism    Chest pain workup was WNL  Stress and echo WNL  Did see cardio as well  Occurs at rest  Stress level better due to workup    Anxiety better  Trazodone has helped with insomnia     bp good on HCTZ    Hypogonadism x15 years   Plans to see endocrine in the future     Needs to get labs    BP Readings from Last 3 Encounters:   05/05/22 128/71   04/29/22 118/76   04/12/22 (!) 148/77     Wt Readings from Last 3 Encounters:   05/05/22 225 lb (102.1 kg)   04/29/22 229 lb (103.9 kg)   04/12/22 230 lb (104.3 kg)       O: VS:  height is 5' 11\" (1.803 m) and weight is 225 lb (102.1 kg). His temperature is 97 °F (36.1 °C). His blood pressure is 128/71. His respiration is 16. AAO/NAD, appropriate affect for mood     Diagnosis Orders   1. Chest pain, unspecified type     2. Intracranial hemorrhage following injury, with loss of consciousness, sequela ([address]     3. Hypertension, unspecified type     4. Adjustment disorder, unspecified type     5. Hypogonadism in male         Plan:  Please refer to resident note for full plan.  Chest pain-chronic. Patient recently had a work-up that was benign for cardiac origin. Continue to monitor   Hypogonadism-chronic, well controlled. Patient was counseled on the need to update his lab work. We will refill testosterone for today. Patient plans to follow with endocrinology which was encouraged.  Adjustment disorder/anxiety-chronic. Improved after negative cardiac work-up. Continue trazodone at night for sleep   Hypertension-chronic, well controlled. Continue hydrochlorothiazide. Return in about 3 months (around 8/5/2022).     Health Maintenance Due   Topic Date Due    Varicella vaccine (1 of 2 - 2-dose childhood series) Never done    COVID-19 Vaccine (1) Never done    HIV screen  Never done   Zannie Cowden Hepatitis C screen  Never done    DTaP/Tdap/Td vaccine (1 - Tdap) Never done     I have discussed the case, including pertinent history and exam findings with the resident and attending physician. I agree with the documented assessment and plan as documented by the resident.       Ney Clemons MD 5/5/2022 2:42 PM

## 2022-06-09 ENCOUNTER — HOSPITAL ENCOUNTER (EMERGENCY)
Age: 32
Discharge: HOME | End: 2022-06-09
Payer: COMMERCIAL

## 2022-06-09 VITALS
WEIGHT: 233 LBS | TEMPERATURE: 98.5 F | HEIGHT: 71 IN | OXYGEN SATURATION: 97 % | BODY MASS INDEX: 32.62 KG/M2 | DIASTOLIC BLOOD PRESSURE: 79 MMHG | SYSTOLIC BLOOD PRESSURE: 147 MMHG | HEART RATE: 87 BPM | RESPIRATION RATE: 16 BRPM

## 2022-06-09 DIAGNOSIS — R07.9 CHEST PAIN, UNSPECIFIED TYPE: Primary | ICD-10-CM

## 2022-06-09 LAB
EKG ATRIAL RATE: 75 BPM
EKG P AXIS: 56 DEGREES
EKG P-R INTERVAL: 146 MS
EKG Q-T INTERVAL: 368 MS
EKG QRS DURATION: 94 MS
EKG QTC CALCULATION (BAZETT): 410 MS
EKG R AXIS: 53 DEGREES
EKG T AXIS: -16 DEGREES
EKG VENTRICULAR RATE: 75 BPM

## 2022-06-09 PROCEDURE — 99214 OFFICE O/P EST MOD 30 MIN: CPT | Performed by: NURSE PRACTITIONER

## 2022-06-09 PROCEDURE — 93005 ELECTROCARDIOGRAM TRACING: CPT | Performed by: NURSE PRACTITIONER

## 2022-06-09 PROCEDURE — 6370000000 HC RX 637 (ALT 250 FOR IP): Performed by: NURSE PRACTITIONER

## 2022-06-09 PROCEDURE — G0463 HOSPITAL OUTPT CLINIC VISIT: HCPCS

## 2022-06-09 PROCEDURE — 99213 OFFICE O/P EST LOW 20 MIN: CPT

## 2022-06-09 PROCEDURE — 93010 ELECTROCARDIOGRAM REPORT: CPT | Performed by: INTERNAL MEDICINE

## 2022-06-09 RX ORDER — ASPIRIN 81 MG/1
324 TABLET, CHEWABLE ORAL ONCE
Status: COMPLETED | OUTPATIENT
Start: 2022-06-09 | End: 2022-06-09

## 2022-06-09 RX ADMIN — ASPIRIN 81 MG 324 MG: 81 TABLET ORAL at 08:27

## 2022-06-09 ASSESSMENT — PAIN DESCRIPTION - DESCRIPTORS
DESCRIPTORS: TIGHTNESS
DESCRIPTORS: TIGHTNESS

## 2022-06-09 ASSESSMENT — PAIN DESCRIPTION - PAIN TYPE: TYPE: ACUTE PAIN

## 2022-06-09 ASSESSMENT — PAIN - FUNCTIONAL ASSESSMENT
PAIN_FUNCTIONAL_ASSESSMENT: 0-10
PAIN_FUNCTIONAL_ASSESSMENT: 0-10

## 2022-06-09 ASSESSMENT — ENCOUNTER SYMPTOMS
SHORTNESS OF BREATH: 1
VOMITING: 0
NAUSEA: 0

## 2022-06-09 ASSESSMENT — PAIN DESCRIPTION - LOCATION
LOCATION: CHEST
LOCATION: CHEST

## 2022-06-09 ASSESSMENT — PAIN SCALES - GENERAL
PAINLEVEL_OUTOF10: 0
PAINLEVEL_OUTOF10: 2
PAINLEVEL_OUTOF10: 3

## 2022-06-09 NOTE — Clinical Note
Dylan Brady was seen and treated in our emergency department on 6/9/2022. He may return to work on 06/10/2022. If you have any questions or concerns, please don't hesitate to call.       Damaso Easley Case, APRN - CNP

## 2022-06-09 NOTE — ED PROVIDER NOTES
[address]  Urgent Care Encounter       CHIEF COMPLAINT       Chief Complaint   Patient presents with    Chest Pain     onset 0530 today       Nurses Notes reviewed and I agree except as noted in the HPI. HISTORY OF PRESENT ILLNESS   Jose Holloway is a 28 y.o. male who presents with complaints of chest pain, onset at around 530 this morning. Patient was at work when the pain started. Pain was described as tightness. Pain is not reproducible. Patient did report some mild shortness of breath with the pain. No nausea, vomiting, dizziness or lightheadedness. Patient has had issues with chest pain and had a normal stress test and echo in April of this year. He was started on hydrochlorothiazide for elevated blood pressure back in April as well. Currently, pain is nearly resolved. The history is provided by the patient. REVIEW OF SYSTEMS     Review of Systems   Constitutional: Negative for appetite change and fever. Respiratory: Positive for shortness of breath. Cardiovascular: Positive for chest pain. Negative for palpitations. Gastrointestinal: Negative for nausea and vomiting. Skin: Negative for pallor and rash. Neurological: Negative for dizziness, light-headedness and numbness. PAST MEDICAL HISTORY         Diagnosis Date    Closed head injury with brief loss of consciousness ([facility] [address] 12/11/2016    GERD (gastroesophageal reflux disease)     Intracranial hemorrhage (HCC)     Primary hypertension     STD (male)        SURGICALHISTORY     Patient  has a past surgical history that includes Tympanostomy tube placement.     CURRENT MEDICATIONS       Discharge Medication List as of 6/9/2022  8:25 AM      CONTINUE these medications which have NOT CHANGED    Details   hydroCHLOROthiazide (HYDRODIURIL) 25 MG tablet Take 1 tablet by mouth every morning, Disp-90 tablet, R-1Normal      traZODone (DESYREL) 50 MG tablet Take 1 tablet by mouth nightly, Disp-90 tablet, R-1Normal      testosterone cypionate (DEPOTESTOTERONE CYPIONATE) 200 MG/ML injection Inject 1 mL into the muscle once for 1 dose., Disp-1 mL, R-5Normal      Testosterone Cypionate 250 MG/ML SOLN Inject 250 mg into the muscle as needed. Twice a weekHistorical Med      therapeutic multivitamin-minerals (THERAGRAN-M) tablet Take 1 tablet by mouth daily. ALLERGIES     Patient is has No Known Allergies. Patients   Immunization History   Administered Date(s) Administered    Influenza Vaccine, unspecified formulation 10/11/2016       FAMILY HISTORY     Patient's family history includes Diabetes in his maternal grandfather and paternal grandmother; Heart Disease in his maternal grandfather; No Known Problems in his father and mother. SOCIAL HISTORY     Patient  reports that he has never smoked. He has never used smokeless tobacco. He reports previous alcohol use of about 1.0 standard drink of alcohol per week. He reports that he does not use drugs. PHYSICAL EXAM     ED TRIAGE VITALS  BP: (!) 147/79, Temp: 98.5 °F (36.9 °C), Heart Rate: 87, Resp: 16, SpO2: 97 %,Estimated body mass index is 32.5 kg/m² as calculated from the following:    Height as of this encounter: 5' 11\" (1.803 m). Weight as of this encounter: 233 lb (105.7 kg). ,No LMP for male patient. Physical Exam  Vitals and nursing note reviewed. Constitutional:       General: He is not in acute distress. Appearance: He is well-developed. HENT:      Head: Normocephalic and atraumatic. Cardiovascular:      Rate and Rhythm: Normal rate and regular rhythm. No extrasystoles are present. Heart sounds: Normal heart sounds, S1 normal and S2 normal. No murmur heard. Pulmonary:      Effort: Pulmonary effort is normal. No respiratory distress. Breath sounds: Normal breath sounds and air entry. Musculoskeletal:      Right lower leg: No edema. Left lower leg: No edema. Skin:     General: Skin is warm and dry. Findings: No rash. Neurological:      General: No focal deficit present. Mental Status: He is alert and oriented to person, place, and time. Psychiatric:         Mood and Affect: Mood normal.         Speech: Speech normal.         Behavior: Behavior normal. Behavior is cooperative. DIAGNOSTIC RESULTS     Labs:  Results for orders placed or performed during the hospital encounter of 06/09/22   EKG 12 Lead   Result Value Ref Range    Ventricular Rate 75 BPM    Atrial Rate 75 BPM    P-R Interval 146 ms    QRS Duration 94 ms    Q-T Interval 368 ms    QTc Calculation (Bazett) 410 ms    P Axis 56 degrees    R Axis 53 degrees    T Axis -16 degrees       IMAGING:    No orders to display         EKG: Reviewed by this provider. Normal sinus rhythm with a rate of 75 bpm.  Normal intervals. There is some T wave inversion in leads III and aVF however this is not new finding. Nonspecific T wave abnormality. URGENT CARE COURSE:     Vitals:    06/09/22 0800 06/09/22 0810 06/09/22 0812 06/09/22 0835   BP: (!) 137/93 (!) 141/73  (!) 147/79   Pulse: 76 71  87   Resp: 16 16  16   Temp:   98.5 °F (36.9 °C)    SpO2: 95% 98%  97%   Weight: 233 lb (105.7 kg)      Height: 5' 11\" (1.803 m)          Medications   aspirin chewable tablet 324 mg (324 mg Oral Given 6/9/22 0827)            PROCEDURES:  None    FINAL IMPRESSION      1. Chest pain, unspecified type          DISPOSITION/ PLAN     Patient presents with complaints of chest pain. Pain is nearly resolved at this time. Pain is not reproducible. Patient did have a normal stress test and echocardiogram in April of this year. Patient does not wish to go to the emergency department at this time. Patient was given 4 baby aspirin and instructed to notify his cardiologist office this morning and follow-up per the recommendation. He was instructed to go to the emergency room if the pain returns, gets worse or with any other concerning symptoms.   Further instructions were outlined verbally and in the patient's discharge instructions. All the patient's questions were answered. The patient/parent agreed with the plan and was discharged from the [facility] in good condition.       PATIENT REFERRED TO:  MD Heriberto Rowe Raymond / CATARINA SIM II.[facility] [id number]      DISCHARGE MEDICATIONS:  Discharge Medication List as of 6/9/2022  8:25 AM          Discharge Medication List as of 6/9/2022  8:25 AM          Discharge Medication List as of 6/9/2022  8:25 AM          EVITA Stapleton CNP    (Please note that portions of this note were completed with a voice recognition program. Efforts were made to edit the dictations but occasionally words are mis-transcribed.)         EVITA Stapleton CNP  06/09/22 [id number]

## 2022-06-09 NOTE — ED NOTES
Pt resting quietly in room with call light in reach. Chest tightness improving 2/10. VS WNL.       Lauren Giles RN  06/09/22 2393

## 2022-06-09 NOTE — ED TRIAGE NOTES
Pt c/o chest pain at work around [employer] ongoing. Pt states it was at it's worse around 0630 of tightness and SOB but is getting \"better\". Pt in today for evaluation. Pt is A & O x 3. Skin is warm and dry. Respirations are easy & non-labored. Pt to Urgent care before open (provider available). Pt refused squad and stated he would wait until provider available -signed refusal form.

## 2022-06-09 NOTE — ED NOTES
Reviewed discharge instructions and given work noted. Instructed to call 911 or go to Emergency Department if chest pain reoccurs. Voiced understanding. To lobby via ambulation-gait steady.        Consuelo Castellon RN  06/09/22 1268

## 2022-06-09 NOTE — ED NOTES
Pt denies chest pain or tightness at present. Skin is warm and dry. Respirations are easy & non-labored. A & O x 3. VS WNL. 4 baby aspirin given per orders.       Erika Hines RN  06/09/22 [id number]

## 2022-06-10 ENCOUNTER — TELEPHONE (OUTPATIENT)
Dept: FAMILY MEDICINE CLINIC | Age: 32
End: 2022-06-10

## 2022-06-10 NOTE — LETTER
[address] [address],[address] [facility]  [address] [facility],4Th [address]  Phone: [phone]  Fax: [phone] [address] [facility]  [address]Gary Guti[address]rez [address], [address]  Phone: [phone]  Fax: [phone]    Estrellita 10, 2022    Mehnaz Edu  [address] [facility]      Dear Seymour Michaud,    This letter is regarding your Emergency Department (ED) visit at [facility] on 6/9/22. Dr. Diane Tadeo wanted to make sure that you understand your discharge instructions and that you were able to fill any prescriptions that may have been ordered for you. Please contact the office at the above phone number if the ED advised you to follow up with Dr. Diane Tadeo, or if you have any further questions or needs. Also did you know -   *Visiting the ED for a non-emergency could result in higher co-pays than you would normally be subject to paying? *You can call your doctor even after hours so they can direct you to the most appropriate care. [facility] practices can often offer you an appointment on the same day that you call. *We have some [address] [facility] offices that offer Walk-in appointments; check our website for availability in your community, [email or web address].      *Evisits are now available for patients for $36 through [employer] for certain conditions:  * Sinus, cold and or cough       * Diarrhea            * Headache  * Heartburn                                * Poison Shraddha          * Back pain     * Urinary problems                         If you do not have [employer] and are interested, please contact the office and a staff member may assist you or go to [email or web address].     Sincerely,     iDane Tadeo MD and your [facility]

## 2023-01-05 ENCOUNTER — HOSPITAL ENCOUNTER (EMERGENCY)
Age: 33
Discharge: HOME | End: 2023-01-05
Attending: EMERGENCY MEDICINE

## 2023-01-05 VITALS
OXYGEN SATURATION: 98 % | SYSTOLIC BLOOD PRESSURE: 144 MMHG | HEIGHT: 71 IN | RESPIRATION RATE: 16 BRPM | HEART RATE: 82 BPM | DIASTOLIC BLOOD PRESSURE: 95 MMHG | TEMPERATURE: 99 F | BODY MASS INDEX: 33.6 KG/M2 | WEIGHT: 240 LBS

## 2023-01-05 DIAGNOSIS — M79.10 MUSCLE PAIN: Primary | ICD-10-CM

## 2023-01-05 DIAGNOSIS — T14.90XA MUSCLE INJURY: ICD-10-CM

## 2023-01-05 PROCEDURE — 99283 EMERGENCY DEPT VISIT LOW MDM: CPT

## 2023-01-05 RX ORDER — NAPROXEN 500 MG/1
500 TABLET ORAL 2 TIMES DAILY PRN
Qty: 60 TABLET | Refills: 0 | Status: SHIPPED | OUTPATIENT
Start: 2023-01-05 | End: 2023-01-15

## 2023-01-05 ASSESSMENT — PAIN DESCRIPTION - LOCATION: LOCATION: CHEST

## 2023-01-05 ASSESSMENT — PAIN - FUNCTIONAL ASSESSMENT: PAIN_FUNCTIONAL_ASSESSMENT: 0-10

## 2023-01-05 ASSESSMENT — PAIN DESCRIPTION - ORIENTATION: ORIENTATION: RIGHT

## 2023-01-05 NOTE — ED PROVIDER NOTES
PATIENT NAME: Zuly Bowers  MRN: [id number]  : 1990  SAMS: 2023    I performed a history and physical examination of the patient and discussed management with the Resident. I reviewed the Resident's note and agree with the documented findings and plan of care. Any areas of disagreement are noted on the chart. I was personally present for the key portions of any procedures and have documented in the chart those procedures where I was not present during the key portions. I have reviewed the emergency nurses triage note and agree with the chief complaint, past medical history, past surgical history, allergies, medications, social and family history as documented unless otherwise noted below. MEDICAL DEDISION MAKING (MDM)     Zuly Bowers is a 33 y.o. male who presents to Emergency Department with Muscle Pain     Presents to ED for evaluation of right chest muscle pain after patient was doing weight lifting around 4:15 PM.  Pain is from right pectoralis major muscle and lateral chest around shoulder area. He has normal range of motion right shoulder. Physical exam: AVSS. Cardiopulmonary exam is unremarkable. Abdomen is soft. Musculoskeletal exam shows tenderness at right lateral chest wall at pectoralis major muscle area suggesting muscle tear versus tendon injury. Right shoulder has normal range of motion. Recommend supportive care by taking over-the-counter NSAIDs, rest, and RICE. Arm sling is offered but patient declines. Discharged with PCP follow-up in 1 week. Vitals:    23 1731 23 1732   BP: (!) 144/95    Pulse:  82   Resp: 16    Temp: 99 °F (37.2 °C)    TempSrc: Oral    SpO2: 98%    Weight: 240 lb (108.9 kg)    Height: 5' 11\" (1.803 m)      Medications - No data to display  Labs Reviewed - No data to display  No orders to display       FINAL IMPRESSION AND DISPOSITION      1. Muscle pain    2.  Muscle injury        DISPOSITION Decision To Discharge 2023 06:13:37 PM      PATIENT REFERRED TO:  Hetal Sullivan MD  [address] [place] [id number]  [phone]    In 2 days      Gary Cain [facility] [phone]  Schedule an appointment as soon as possible for a visit in 1 week  As needed    DISCHARGE MEDICATIONS:  New Prescriptions    No medications on file       (Please note that portions of this note were completed with a voice recognition program.  Efforts were made to edit the dictations but occasionally words aremis-transcribed.)    MD Luann Slater MD  01/05/23 1314

## 2023-01-05 NOTE — DISCHARGE INSTRUCTIONS
You were seen for pain to your right pectoralis major. This likely is a partial tear. Follow-up with orthopedics as needed. Please do not do any heavy lifting for at least 2 weeks. Do not do any activity that hurts. Return to ED if any worsening of activity.

## 2023-01-05 NOTE — ED PROVIDER NOTES
[address] [facility] [address] [address] EMERGENCY DEPT      EMERGENCY MEDICINE     Pt Name: Milo Celestin  MRN: [id number]  [place] 1990  Date of evaluation: 1/5/2023  Provider: Taryn Washington MD  Supervising Physician: Mario Hawkins       Chief Complaint   Patient presents with    Muscle Pain     History obtained from the patient. HISTORY OF PRESENT ILLNESS   Milo Celestin is a pleasant 33 y.o. male who presents to the emergency department from from home, by private vehicle for evaluation of right chest muscle pain. Patient states that he was lifting heavier than normal, had sudden onset pain in his right pec, currently rated 1 out of 10, worse with any movement, slight increase of pain with abduction, described as burning and pressure. Patient denies any numbness, tingling, weakness. Paramaraceli Pickardter PASTMEDICAL HISTORY     Past Medical History:   Diagnosis Date    Closed head injury with brief loss of consciousness ([facility] 12/11/2016    GERD (gastroesophageal reflux disease)     Intracranial hemorrhage (HCC)     Primary hypertension     STD (male)        Patient Active Problem List   Diagnosis Code    Intracranial hemorrhage following injury with loss of consciousness ([facility] S06.309A    Chest pain R07.9     SURGICAL HISTORY       Past Surgical History:   Procedure Laterality Date    TYMPANOSTOMY TUBE PLACEMENT         CURRENT MEDICATIONS       Previous Medications    HYDROCHLOROTHIAZIDE (HYDRODIURIL) 25 MG TABLET    Take 1 tablet by mouth every morning    TESTOSTERONE CYPIONATE (DEPOTESTOTERONE CYPIONATE) 200 MG/ML INJECTION    Inject 1 mL into the muscle once for 1 dose. TESTOSTERONE CYPIONATE 250 MG/ML SOLN    Inject 250 mg into the muscle as needed. Twice a week    THERAPEUTIC MULTIVITAMIN-MINERALS (THERAGRAN-M) TABLET    Take 1 tablet by mouth daily. TRAZODONE (DESYREL) 50 MG TABLET    Take 1 tablet by mouth nightly       ALLERGIES     has No Known Allergies. FAMILY HISTORY     He indicated that his mother is alive. He indicated that his father is alive. He indicated that his maternal grandmother is alive. He indicated that his maternal grandfather is . He indicated that his paternal grandmother is . He indicated that his paternal grandfather is . SOCIAL HISTORY       Social History     Tobacco Use    Smoking status: Never    Smokeless tobacco: Never   Vaping Use    Vaping Use: Never used   Substance Use Topics    Alcohol use: Not Currently     Alcohol/week: 1.0 standard drink     Types: 1 Cans of beer per week    Drug use: No       PHYSICAL EXAM       ED Triage Vitals   BP Temp Temp Source Heart Rate Resp SpO2 Height Weight   23 1731 23 1731 23 1731 23 1732 23 1731 23 1731 23 1731 23 1731   (!) 144/95 99 °F (37.2 °C) Oral 82 16 98 % 5' 11\" (1.803 m) 240 lb (108.9 kg)       Additional Vital Signs:  Vitals:    23 1732   BP:    Pulse: 82   Resp:    Temp:    SpO2:      Physical Exam  Constitutional:       Appearance: Normal appearance. He is not ill-appearing. HENT:      Head: Normocephalic and atraumatic. Right Ear: External ear normal.      Left Ear: External ear normal.      Nose: Nose normal.      Mouth/Throat:      Mouth: Mucous membranes are moist.      Pharynx: Oropharynx is clear. Eyes:      Extraocular Movements: Extraocular movements intact. Conjunctiva/sclera: Conjunctivae normal.   Cardiovascular:      Rate and Rhythm: Normal rate and regular rhythm. Pulmonary:      Effort: Pulmonary effort is normal. No respiratory distress. Chest:      Chest wall: Tenderness (right pec major, palpable not consistant with hematoma/inflammation, tendons intact, nontender) present. Abdominal:      General: Abdomen is flat. There is no distension. Palpations: Abdomen is soft. Tenderness: There is no abdominal tenderness. Musculoskeletal:      Cervical back: Normal range of motion and neck supple. No rigidity.    Skin: General: Skin is warm and dry. Capillary Refill: Capillary refill takes less than 2 seconds. Findings: No bruising or erythema. Neurological:      General: No focal deficit present. Mental Status: He is alert. Gait: Gait normal.       FORMAL DIAGNOSTIC RESULTS     RADIOLOGY: Interpretation per the Radiologist below, if available at the time of this note (none if blank): No orders to display       LABS: (none if blank)  Labs Reviewed - No data to display    (Any cultures that may have been sent were not resulted at the time of this patient visit)    [facility] / ED COURSE:     Assessment and Plan: This is a 33 y.o. male with no significant past medical history , presenting with right pec muscle pain. Physical exam significant for elderly tender right pectoralis major, palpable area of swelling consistent with inflammation or hematoma. Differential diagnoses include, but not limited to partial tear, hematoma. Patient likely has Partial tear of the pectoralis major. Discussed at length with patient need for rest, stretching, icing. Recommended that he follow-up with orthopedic institute [address] if he has continued pain. Discharged home with strict return precautions, follow-up. Vitals Reviewed:    Vitals:    01/05/23 1731 01/05/23 1732   BP: (!) 144/95    Pulse:  82   Resp: 16    Temp: 99 °F (37.2 °C)    TempSrc: Oral    SpO2: 98%    Weight: 240 lb (108.9 kg)    Height: 5' 11\" (1.803 m)        The patient was seen and examined. Appropriate diagnostic testing was performed and results reviewed with the patient. Nursing notes reviewed. The results of pertinent diagnostic studies and exam findings were discussed. The patients provisional diagnosis and plan of care were discussed with the patient and present family who expressed understanding. Any medications were reviewed and indications and risks of medications were discussed with the patient /family present.      ED Medications administered this visit:  (None if blank)  Medications - No data to display      DISCHARGE PRESCRIPTIONS: (None if blank)  New Prescriptions    No medications on file       FINAL IMPRESSION      1. Muscle pain    2. Muscle injury          DISPOSITION/PLAN     Final diagnoses:   Muscle pain   Muscle injury     Condition: condition: good  Dispo: Discharge to home        OUTPATIENT FOLLOW UP THE PATIENT:    Josesito Tierney MD  Randy [address] [place] [address]  [phone]    In 2 days      Gary [address]  [address] [facility] [phone]  Schedule an appointment as soon as possible for a visit in 1 week  As needed    Strict return precautions and follow-up discussed with patient. This transcription was electronically signed. Parts of this transcriptions may have been dictated by use of voice recognition software and electronically transcribed, and parts may have been transcribed with the assistance of an ED scribe. The transcription may contain errors not detected in proofreading. Please refer to my supervising physician's documentation if my documentation differs.     Electronically Signed: Tiffani Berry MD, 01/05/23, 6:15 PM        Tiffani Berry MD  Resident  01/06/23 6897 Patient/Caregiver provided printed discharge information.

## 2023-01-06 ENCOUNTER — TELEPHONE (OUTPATIENT)
Dept: FAMILY MEDICINE CLINIC | Age: 33
End: 2023-01-06

## 2023-01-06 NOTE — LETTER
[address] [facility]  [address], [address]  Phone: [phone]  Fax: [phone]    January 6, 2023    Guillermo Paz  [address] [facility]    Dear Enrico Thompson,    This letter is regarding your Emergency Department (ED) visit at [address] [address] on 1/5/23. Jacey Flores wanted to make sure that you understand your discharge instructions and that you were able to fill any prescriptions that may have been ordered for you. Please contact the office at the above phone number if the ED advised you to follow up with Jacey Flores, or if you have any further questions or needs. Also did you know -   *Visiting the ED for a non-emergency could result in higher co-pays than you would normally be subject to paying? *You can call your doctor even after hours so they can direct you to the most appropriate care. [facility]) practices can often offer you an appointment on the same day that you call. *We have some [address] [place] [address] offices that offer Walk-in appointments; check our website for availability in your community, www. [employer].      *Evisits are now available for patients for $36 through [employer] for certain conditions:  * Sinus, cold and or cough       * Diarrhea            * Headache  * Heartburn                                * Poison Shraddha          * Back pain     * Urinary problems                         If you do not have [employer] and are interested, please contact the office and a staff member may assist you or go to [email or web address].     Sincerely,   Myranda Givens MD and your [facility]

## 2023-12-25 NOTE — PROGRESS NOTES
Health Maintenance Due   Topic Date Due    Hepatitis C screen  Never done    Varicella vaccine (1 of 2 - 2-dose childhood series) Never done    COVID-19 Vaccine (1) Never done    Depression Screen  Never done    HIV screen  Never done    DTaP/Tdap/Td vaccine (1 - Tdap) Never done None

## 2025-01-08 ENCOUNTER — HOSPITAL ENCOUNTER (EMERGENCY)
Age: 35
Discharge: HOME | End: 2025-01-08

## 2025-01-08 VITALS
WEIGHT: 240 LBS | HEIGHT: 71 IN | HEART RATE: 80 BPM | TEMPERATURE: 97.8 F | RESPIRATION RATE: 16 BRPM | BODY MASS INDEX: 33.6 KG/M2 | SYSTOLIC BLOOD PRESSURE: 135 MMHG | OXYGEN SATURATION: 98 % | DIASTOLIC BLOOD PRESSURE: 67 MMHG

## 2025-01-08 DIAGNOSIS — Z00.00 PHYSICAL EXAM: Primary | ICD-10-CM

## 2025-01-08 PROCEDURE — SWPH SPORTS/WORK PERMIT PHYSICAL: Performed by: NURSE PRACTITIONER

## 2025-01-08 PROCEDURE — 9900000020 HC SPORTS PHYSICAL-SELF PAY

## 2025-01-08 ASSESSMENT — ENCOUNTER SYMPTOMS
RHINORRHEA: 0
SHORTNESS OF BREATH: 0
COUGH: 0
CHEST TIGHTNESS: 0
NAUSEA: 0
SORE THROAT: 0
DIARRHEA: 0
VOMITING: 0

## 2025-01-08 NOTE — ED PROVIDER NOTES
[facility] EMERGENCY DEPARTMENT  Urgent Care Encounter       CHIEF COMPLAINT       Chief Complaint   Patient presents with    Annual Exam       Nurses Notes reviewed and I agree except as noted in the HPI.  HISTORY OF PRESENT ILLNESS   Everardo Escalante is a 34 y.o. male who presents to the [facility] requesting a physical exam.  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  He reports that the site is requesting a physical exam prior to getting blood work completed.  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    The history is provided by the patient. No  was used.       REVIEW OF SYSTEMS     Review of Systems   Constitutional:  Negative for activity change, appetite change, chills, fatigue and fever.   HENT:  Negative for ear discharge, ear pain, rhinorrhea and sore throat.    Respiratory:  Negative for cough, chest tightness and shortness of breath.    Cardiovascular:  Negative for chest pain.   Gastrointestinal:  Negative for diarrhea, nausea and vomiting.   Genitourinary:  Negative for dysuria.   Musculoskeletal:  Negative for arthralgias.   Skin:  Negative for rash.   Allergic/Immunologic: Negative for environmental allergies and food allergies.   Neurological:  Negative for dizziness and headaches.       PAST MEDICAL HISTORY         Diagnosis Date    Closed head injury with brief loss of consciousness 12/11/2016    GERD (gastroesophageal reflux disease)     Intracranial hemorrhage (HCC)     Primary hypertension     STD (male)        SURGICALHISTORY     Patient  has a past surgical history that includes Tympanostomy tube placement.    CURRENT MEDICATIONS       Discharge Medication List as of 1/8/2025  5:07 PM        CONTINUE these medications which have NOT CHANGED    Details   naproxen (NAPROSYN) 500 MG tablet Take 1 tablet

## 2025-01-09 ENCOUNTER — TELEPHONE (OUTPATIENT)
Dept: FAMILY MEDICINE CLINIC | Age: 35
End: 2025-01-09

## 2025-03-03 ENCOUNTER — HOSPITAL ENCOUNTER (EMERGENCY)
Age: 35
Discharge: HOME | End: 2025-03-03
Payer: COMMERCIAL

## 2025-03-03 VITALS
BODY MASS INDEX: 33.6 KG/M2 | DIASTOLIC BLOOD PRESSURE: 78 MMHG | HEIGHT: 71 IN | OXYGEN SATURATION: 98 % | SYSTOLIC BLOOD PRESSURE: 129 MMHG | TEMPERATURE: 99.2 F | HEART RATE: 88 BPM | RESPIRATION RATE: 20 BRPM | WEIGHT: 240 LBS

## 2025-03-03 DIAGNOSIS — J20.9 ACUTE BRONCHITIS, UNSPECIFIED ORGANISM: Primary | ICD-10-CM

## 2025-03-03 PROCEDURE — 99213 OFFICE O/P EST LOW 20 MIN: CPT

## 2025-03-03 RX ORDER — PREDNISONE 20 MG/1
20 TABLET ORAL 2 TIMES DAILY
Qty: 10 TABLET | Refills: 0 | Status: SHIPPED | OUTPATIENT
Start: 2025-03-03 | End: 2025-03-08

## 2025-03-03 RX ORDER — DEXTROMETHORPHAN HYDROBROMIDE AND PROMETHAZINE HYDROCHLORIDE 15; 6.25 MG/5ML; MG/5ML
5 SYRUP ORAL 4 TIMES DAILY PRN
Qty: 118 ML | Refills: 0 | Status: SHIPPED | OUTPATIENT
Start: 2025-03-03 | End: 2025-03-10

## 2025-03-03 ASSESSMENT — PAIN DESCRIPTION - LOCATION: LOCATION: HEAD;THROAT

## 2025-03-03 ASSESSMENT — PAIN DESCRIPTION - ONSET: ONSET: ON-GOING

## 2025-03-03 ASSESSMENT — PAIN SCALES - GENERAL: PAINLEVEL_OUTOF10: 5

## 2025-03-03 ASSESSMENT — ENCOUNTER SYMPTOMS
COUGH: 1
SORE THROAT: 1

## 2025-03-03 ASSESSMENT — PAIN DESCRIPTION - DESCRIPTORS: DESCRIPTORS: ACHING;SHARP

## 2025-03-03 ASSESSMENT — PAIN DESCRIPTION - FREQUENCY: FREQUENCY: CONTINUOUS

## 2025-03-03 ASSESSMENT — PAIN - FUNCTIONAL ASSESSMENT
PAIN_FUNCTIONAL_ASSESSMENT: 0-10
PAIN_FUNCTIONAL_ASSESSMENT: PREVENTS OR INTERFERES SOME ACTIVE ACTIVITIES AND ADLS

## 2025-03-03 NOTE — ED NOTES
PT GIVEN DISCHARGE INSTRUCTIONS, VERBALIZES UNDERSTANDING.  PT ASSESSMENT UNCHANGED, DISCHARGED IN STABLE CONDITION.        Aureliano Hernandez, RN  03/03/25 8635

## 2025-03-03 NOTE — ED PROVIDER NOTES
[facility] EMERGENCY DEPARTMENT  Urgent Care Encounter       CHIEF COMPLAINT       Chief Complaint   Patient presents with    Cough     Thinks he had influenza about 7 weeks ago and still has cough, headache and sore thoat       Nurses Notes reviewed and I agree except as noted in the HPI.  HISTORY OF PRESENT ILLNESS   Everardo Escalante is a 34 y.o. male who presents with complaints of cough, sore throat, congestion that started 7 weeks ago. Pt reports that he thinks he originally had influenza. Pt reports using otc medication without relieve. Pt reports cough is bothering him the most. Pt reports pain 5/10 at this time.     The history is provided by the patient.       REVIEW OF SYSTEMS     Review of Systems   HENT:  Positive for congestion and sore throat.    Respiratory:  Positive for cough.    All other systems reviewed and are negative.      PAST MEDICAL HISTORY         Diagnosis Date    Closed head injury with brief loss of consciousness (HCC) 12/11/2016    GERD (gastroesophageal reflux disease)     Intracranial hemorrhage (HCC)     Primary hypertension     STD (male)        SURGICALHISTORY     Patient  has a past surgical history that includes Tympanostomy tube placement.    CURRENT MEDICATIONS       Previous Medications    NAPROXEN (NAPROSYN) 500 MG TABLET    Take 1 tablet by mouth 2 times daily as needed for Pain    TESTOSTERONE CYPIONATE (DEPOTESTOTERONE CYPIONATE) 200 MG/ML INJECTION    Inject 1 mL into the muscle once for 1 dose.    TESTOSTERONE CYPIONATE 250 MG/ML SOLN    Inject 250 mg into the muscle as needed. Twice a week    THERAPEUTIC MULTIVITAMIN-MINERALS (THERAGRAN-M) TABLET    Take 1 tablet by mouth daily       ALLERGIES     Patient is has No Known Allergies.    Patients   Immunization History   Administered Date(s) Administered    Influenza Vaccine, unspecified formulation 10/11/2016       FAMILY HISTORY     Patient's family history includes Diabetes in his maternal  grandfather and paternal grandmother; Heart Disease in his maternal grandfather; No Known Problems in his father and mother.    SOCIAL HISTORY     Patient  reports that he has never smoked. He has never used smokeless tobacco. He reports that he does not currently use alcohol after a past usage of about 1.0 standard drink of alcohol per week. He reports that he does not use drugs.    PHYSICAL EXAM     ED TRIAGE VITALS  BP: 129/78, Temp: 99.2 °F (37.3 °C), Pulse: 88, Respirations: 20, SpO2: 98 %,Estimated body mass index is 33.47 kg/m² as calculated from the following:    Height as of this encounter: 1.803 m (5' 11\").    Weight as of this encounter: 108.9 kg (240 lb).,No LMP for male patient.    Physical Exam  Vitals and nursing note reviewed.   HENT:      Head: Normocephalic.      Right Ear: Tympanic membrane normal.      Left Ear: Tympanic membrane normal.      Nose: Congestion present.      Right Sinus: No maxillary sinus tenderness or frontal sinus tenderness.      Left Sinus: No maxillary sinus tenderness or frontal sinus tenderness.      Mouth/Throat:      Pharynx: Posterior oropharyngeal erythema present.   Cardiovascular:      Rate and Rhythm: Normal rate and regular rhythm.      Heart sounds: Normal heart sounds.   Pulmonary:      Effort: Pulmonary effort is normal.      Breath sounds: Normal breath sounds.      Comments: Productive cough  Musculoskeletal:      Cervical back: Normal range of motion and neck supple.   Lymphadenopathy:      Cervical: No cervical adenopathy.   Skin:     General: Skin is warm and dry.      Capillary Refill: Capillary refill takes less than 2 seconds.   Neurological:      General: No focal deficit present.      Mental Status: He is oriented to person, place, and time.   Psychiatric:         Mood and Affect: Mood normal.         Behavior: Behavior normal.         DIAGNOSTIC RESULTS     Labs:No results found for this visit on 03/03/25.    IMAGING:    No orders to display

## 2025-03-04 ENCOUNTER — TELEPHONE (OUTPATIENT)
Dept: FAMILY MEDICINE CLINIC | Age: 35
End: 2025-03-04